# Patient Record
Sex: FEMALE | Race: WHITE | NOT HISPANIC OR LATINO | Employment: FULL TIME | URBAN - METROPOLITAN AREA
[De-identification: names, ages, dates, MRNs, and addresses within clinical notes are randomized per-mention and may not be internally consistent; named-entity substitution may affect disease eponyms.]

---

## 2017-01-11 ENCOUNTER — HOSPITAL ENCOUNTER (EMERGENCY)
Facility: HOSPITAL | Age: 52
Discharge: HOME | End: 2017-01-12
Attending: EMERGENCY MEDICINE | Admitting: EMERGENCY MEDICINE
Payer: COMMERCIAL

## 2017-01-11 ENCOUNTER — ALLSCRIPTS OFFICE VISIT (OUTPATIENT)
Dept: OTHER | Facility: OTHER | Age: 52
End: 2017-01-11

## 2017-01-11 VITALS
OXYGEN SATURATION: 97 % | SYSTOLIC BLOOD PRESSURE: 130 MMHG | RESPIRATION RATE: 18 BRPM | HEART RATE: 111 BPM | DIASTOLIC BLOOD PRESSURE: 80 MMHG | TEMPERATURE: 97.9 F

## 2017-01-11 DIAGNOSIS — T78.40XA ALLERGIC REACTION CAUSED BY A DRUG, INITIAL ENCOUNTER: Primary | ICD-10-CM

## 2017-01-11 LAB
BILIRUB UR QL STRIP: NEGATIVE
BILIRUB UR QL STRIP: NORMAL
CLARITY UR: CLEAR
CLARITY UR: NORMAL
COLOR UR: YELLOW
COLOR UR: YELLOW
COLOR, POC: YELLOW
GLUCOSE (HISTORICAL): NORMAL
GLUCOSE UR STRIP-MCNC: NEGATIVE MG/DL
HGB UR QL STRIP.AUTO: NEGATIVE
HGB UR QL STRIP.AUTO: NORMAL
KETONES UR STRIP-MCNC: NEGATIVE MG/DL
KETONES UR STRIP-MCNC: NORMAL MG/DL
LEUKOCYTE ESTERASE UR QL STRIP: ABNORMAL
LEUKOCYTE ESTERASE UR QL STRIP: NORMAL
NITRITE UR QL STRIP: NEGATIVE
NITRITE UR QL STRIP: NORMAL
PH UR STRIP.AUTO: 5 [PH]
PH UR STRIP.AUTO: 7 [PH] (ref 4.5–8)
PROT UR STRIP-MCNC: NEGATIVE MG/DL
PROT UR STRIP-MCNC: NORMAL MG/DL
SP GR UR STRIP.AUTO: 1.01 (ref 1–1.03)
SP GR UR STRIP.AUTO: 1.02
UROBILINOGEN UR QL STRIP.AUTO: 0.2
UROBILINOGEN UR QL STRIP.AUTO: 0.2 E.U./DL

## 2017-01-11 PROCEDURE — 81001 URINALYSIS AUTO W/SCOPE: CPT

## 2017-01-11 PROCEDURE — 96374 THER/PROPH/DIAG INJ IV PUSH: CPT

## 2017-01-11 PROCEDURE — 81002 URINALYSIS NONAUTO W/O SCOPE: CPT | Performed by: EMERGENCY MEDICINE

## 2017-01-11 PROCEDURE — 87086 URINE CULTURE/COLONY COUNT: CPT

## 2017-01-11 RX ORDER — FEXOFENADINE HCL 180 MG/1
180 TABLET ORAL DAILY
COMMUNITY

## 2017-01-11 RX ORDER — RANITIDINE 150 MG/1
150 TABLET ORAL 2 TIMES DAILY
COMMUNITY
End: 2020-04-06

## 2017-01-11 RX ORDER — CEPHALEXIN 250 MG/1
500 CAPSULE ORAL ONCE
Status: COMPLETED | OUTPATIENT
Start: 2017-01-11 | End: 2017-01-11

## 2017-01-11 RX ORDER — ETODOLAC 200 MG/1
200 CAPSULE ORAL AS NEEDED
COMMUNITY
End: 2022-07-29 | Stop reason: ALTCHOICE

## 2017-01-11 RX ORDER — LEVOTHYROXINE SODIUM 0.07 MG/1
75 TABLET ORAL DAILY
COMMUNITY
End: 2019-07-19

## 2017-01-11 RX ADMIN — CEPHALEXIN 500 MG: 250 CAPSULE ORAL at 23:36

## 2017-01-11 RX ADMIN — FAMOTIDINE 20 MG: 10 INJECTION, SOLUTION INTRAVENOUS at 23:18

## 2017-01-12 LAB
BACTERIA UR QL AUTO: NORMAL /HPF
NON-SQ EPI CELLS URNS QL MICRO: NORMAL /HPF
RBC #/AREA URNS AUTO: NORMAL /HPF
WBC #/AREA URNS AUTO: NORMAL /HPF

## 2017-01-12 PROCEDURE — 99283 EMERGENCY DEPT VISIT LOW MDM: CPT

## 2017-01-12 RX ORDER — CEPHALEXIN 500 MG/1
500 CAPSULE ORAL 2 TIMES DAILY
Qty: 14 CAPSULE | Refills: 0 | Status: SHIPPED | OUTPATIENT
Start: 2017-01-12 | End: 2017-01-19

## 2017-01-13 LAB — BACTERIA UR CULT: NORMAL

## 2017-01-17 ENCOUNTER — TRANSCRIBE ORDERS (OUTPATIENT)
Dept: ADMINISTRATIVE | Facility: HOSPITAL | Age: 52
End: 2017-01-17

## 2017-01-17 DIAGNOSIS — R20.2 PARESTHESIA: Primary | ICD-10-CM

## 2017-01-30 ENCOUNTER — OFFICE VISIT (OUTPATIENT)
Dept: RADIOLOGY | Facility: CLINIC | Age: 52
End: 2017-01-30
Payer: COMMERCIAL

## 2017-01-30 DIAGNOSIS — R20.2 PARESTHESIA: ICD-10-CM

## 2017-01-30 PROCEDURE — 95909 NRV CNDJ TST 5-6 STUDIES: CPT

## 2017-01-30 PROCEDURE — 95886 MUSC TEST DONE W/N TEST COMP: CPT

## 2017-02-22 ENCOUNTER — ALLSCRIPTS OFFICE VISIT (OUTPATIENT)
Dept: OTHER | Facility: OTHER | Age: 52
End: 2017-02-22

## 2017-02-22 LAB
BILIRUB UR QL STRIP: NORMAL
CLARITY UR: NORMAL
COLOR UR: YELLOW
GLUCOSE (HISTORICAL): NORMAL
HGB UR QL STRIP.AUTO: NORMAL
KETONES UR STRIP-MCNC: NORMAL MG/DL
LEUKOCYTE ESTERASE UR QL STRIP: NORMAL
NITRITE UR QL STRIP: NORMAL
PH UR STRIP.AUTO: 5 [PH]
PROT UR STRIP-MCNC: NORMAL MG/DL
SP GR UR STRIP.AUTO: 1.01
UROBILINOGEN UR QL STRIP.AUTO: 0.2

## 2017-04-19 ENCOUNTER — ALLSCRIPTS OFFICE VISIT (OUTPATIENT)
Dept: OTHER | Facility: OTHER | Age: 52
End: 2017-04-19

## 2017-04-19 ENCOUNTER — APPOINTMENT (OUTPATIENT)
Dept: OCCUPATIONAL THERAPY | Facility: HOSPITAL | Age: 52
End: 2017-04-19
Payer: COMMERCIAL

## 2017-04-19 DIAGNOSIS — Z12.31 ENCOUNTER FOR SCREENING MAMMOGRAM FOR MALIGNANT NEOPLASM OF BREAST: ICD-10-CM

## 2017-04-19 DIAGNOSIS — G56.01 CARPAL TUNNEL SYNDROME OF RIGHT WRIST: ICD-10-CM

## 2017-04-19 DIAGNOSIS — G56.02 CARPAL TUNNEL SYNDROME OF LEFT WRIST: ICD-10-CM

## 2017-04-19 PROCEDURE — L3906 WHO W/O JOINTS CF: HCPCS

## 2017-05-02 ENCOUNTER — ALLSCRIPTS OFFICE VISIT (OUTPATIENT)
Dept: OTHER | Facility: OTHER | Age: 52
End: 2017-05-02

## 2018-01-11 NOTE — RESULT NOTES
Verified Results  (Q) SJOGRENS ANTIBODIES (SS-A,SS-B) [id number] 11:21AM [employer]   REPORT COMMENT:  FASTING:YES     Test Name Result Flag Reference   SJOGREN'S ANTIBODY (SS-A) <1 0 NEG AI  <1 0 NEG   SJOGREN'S ANTIBODY (SS-B) <1 0 NEG AI  <1 0 NEG     (Q) LYME DISEASE AB, TOTAL W/REFL WB (IGG, IGM) [id number] 11:21AM [employer]     Test Name Result Flag Reference   LYME AB SCREEN < OR = 0 90 index     Index                 Interpretation                     -----                 --------------                     < or = 0 90           Negative                     0  91-1 09             Equivocal                     > or = 1 10           Positive     The use of purified VlsE-1 and PepC10 antigens in this  assay provides improved specificity compared to assays  that utilize whole cell lysates of B  burgdorferi, the  causative agent of Lyme disease, and slightly better  sensitivity compared to the C6 antibody assay  As recommended by the Food and Drug   Administration (FDA), all samples with positive or   equivocal results in a Borrelia burgdorferi antibody    EIA (screening) will be tested using a blot method  Positive or equivocal screening test results should not   be interpreted as truly positive until verified as such   using a supplemental assay (e g , B  burgdorferi blot)  The screening test and/or blot for B  burgdorferi   antibodies may be falsely negative in early stages of   Lyme disease, including the period when erythema   migrans is apparent       (Q) MONSTER IFA SCREEN W/REFL TO TITER AND PATTERN, IFA [id number] 11:21AM [employer]     Test Name Result Flag Reference   MONSTER SCREEN, IFA NEGATIVE  NEGATIVE     (1) RF QUANTITATION [id number] 11:21AM [employer]     Test Name Result Flag Reference   RHEUMATOID FACTOR 12 IU/mL  <14     (Q) SED RATE BY MODIFIED WESTERGREN [id number] 11:21AM [employer]     Test Name Result Flag Reference   SED RATE BY MODIFIED$WESTERGREN 6 mm/h  < OR = 30

## 2018-01-13 VITALS
HEIGHT: 62 IN | BODY MASS INDEX: 31.47 KG/M2 | HEART RATE: 90 BPM | DIASTOLIC BLOOD PRESSURE: 84 MMHG | SYSTOLIC BLOOD PRESSURE: 122 MMHG | WEIGHT: 171 LBS

## 2018-01-13 VITALS
WEIGHT: 172 LBS | DIASTOLIC BLOOD PRESSURE: 72 MMHG | HEIGHT: 62 IN | TEMPERATURE: 96.5 F | HEART RATE: 88 BPM | BODY MASS INDEX: 31.65 KG/M2 | SYSTOLIC BLOOD PRESSURE: 118 MMHG

## 2018-01-13 NOTE — CONSULTS
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)    Discussion/Summary    59-year-old female, accompanied by her aunt, presents to review MRI lumbar and cervical spine, and history of chronic neck and back pain  Films were reviewed in detail by Dr Antionette Del Rio and reviewed by Dr Antionette Del Rio with the patient  Cervical spine studies reveal some narrowing and mild foraminal stenosis on the right at C5-C6 without myelopathic signs or findings  Lumbar study was also reviewed and revealed borderline T10-T11 herniation without significant stenosis, and L4-L5 disc herniation without significant central or foraminal stenosis  Clinical examination was without motor-sensory deficits or myelopathic findings  Patient was cautioned to observe for future myelopathic findings such as increasing urinary incontinence, progressive gait disturbance and imbalance, difficulty with upper extremities fine motor activities such as buttoning, opening jars  No miladis lesions are appreciated at this time requiring neurosurgical intervention, and follow-up with neurosurgery will be on an as-needed basis, she does understand however should she have progressive myelopathic symptoms she is to call and return for reassessment  In the interim return follow-up with pain management (Dr Lynsey Fernandez) is advised, additionally continue physical therapy and regular back strengthening program is also recommended, no specific activity restrictions are noted  These findings, impressions and recommendations reviewed in great detail with the patient and her aunt, they expressed understanding and agreement, their questions were answered completely and to their satisfaction   And as noted she understands to call and return should she have a significant change in her neurologic status  Chief Complaint    1  Arm Pain   2  Back Pain   3  Neck Pain  Initial consultation, neck pain, back pain, right hand intermittent numbness  History of Present Illness  55-year-old female, accompanied by her aunt presents for the above complaints reports has chronic pain in these areas intermittently for the past 2-3 years or longer  Approximately one year ago however she had an exacerbation of her low back pain which necessitated a visit to the emergency department for pain medication  That has since resolved  However she does continue to report with certain activities such as shoveling or raking she will aggravate her low back  She denies radiation into her legs  Reports pain is principally in her right greater than left buttocks with some radiation to the right groin  Currently she is asymptomatic  She has followed with her family physician for this and with an exacerbation of her low back pain had an MRI of her lumbar spine in December  Neck pain is also intermittent principally involving her right shoulder right trapezius and occasionally will have numbness and tingling of her right hand  Currently she is asymptomatic  She has followed with her family physician for this and has had MRI of her cervical spine in September  Patient denies bowel or bladder symptoms associated with this, denies difficulty with ambulation or gait disturbance although does admit recently she has been a little more clumsy and falls secondary to balance issues  Denies difficulty with upper extremities relative to handling buttons, opening jars, or denies dropping things such as glasses    She has had physical therapy times 8-10 weeks with little improvement, she also was referred for pain management consultation with Dr Ryan Ruffin however elected to hold off on this pending consultation with neurosurgery (Dr Jacqui Diego)   Typically takes Motrin, Tylenol and has been on Lodine in the past as well for pain relief  But takes these medications sporadically  Has not had trial of muscle relaxants and/or narcotics  Pawel Gaona presents with complaints of gradual onset of constant episodes of moderate right > left and bilateral lower back pain, described as aching, throbbing and tingling, radiating to the right buttock, right shoulder and right neck  On a scale of 1 to 10, the patient rates the pain as 2  Associated symptoms include spasm, stiffness, night sweats, general malaise and urinary incontinence, but no fever, no abdominal pain, no weight loss, no leg numbness, no arm weakness, no leg weakness, no fecal incontinence, no urinary retention and no rash localized to the area of pain  arm numbness (R>L)  Pawel Gaona presents with complaints of neck pain  Associated symptoms include muscle spasm and upper extremity paresthesias, but no neck stiffness, no crepitus, no tenderness, no impaired range of motion, no shoulder pain, no headache, no upper extremity weakness, no impaired hearing, no impaired memory and no impaired vision  Pawel Gaona presents with complaints of no pain in the arms  Associated symptoms include tingling, but no swelling, no redness, no ecchymosis, no deformity, no stiffness, no instability, no decreased range of motion, no numbness and no weakness  Pawel Gaona presents with complaints of no elbow symptoms  Pawel Gaona presents with complaints of no finger symptoms  no forearm symptoms Pawel Gaona presents with complaints of no hand symptoms  Pawel Gaona presents with complaints of no shoulder symptoms  no wrist symptoms      Review of Systems    Constitutional: feeling tired    The patient presents with complaints of visual disturbances, described as blurry vision  ENT: no complaints of earache, no loss of hearing, no nose bleeds, no nasal discharge, no sore throat, no hoarseness     Cardiovascular: No complaints of slow heart rate, no fast heart rate, no chest pain, no palpitations, no leg claudication, no lower extremity edema  Respiratory: No complaints of shortness of breath, no wheezing, no cough, no SOB on exertion, no orthopnea, no PND  Gastrointestinal: constipation  Genitourinary: incontinence  Musculoskeletal: arthralgias, joint swelling, myalgias and joint stiffness  Integumentary: No complaints of skin rash or lesions, no itching, no skin wounds, no breast pain or lump  Neurological: headache, numbness, tingling, dizziness and N/T in the hands, and on the right side of the head , but as noted in HPI, no confusion, no limb weakness and no difficulty walking  Psychiatric: Not suicidal, no sleep disturbance, no anxiety or depression, no change in personality, no emotional problems  Endocrine: No complaints of proptosis, no hot flashes, no muscle weakness, no deepening of the voice, no feelings of weakness  Hematologic/Lymphatic: No complaints of swollen glands, no swollen glands in the neck, does not bleed easily, does not bruise easily  ROS reviewed  Active Problems    1  Allergic rhinitis (477 9) (J30 9)   2  Bulge of lumbar disc without myelopathy (722 10) (M51 26)   3  Cervical cancer screening (V76 2) (Z12 4)   4  Cervical disc disorder with radiculopathy, mid-cervical region (723 4) (M50 12)   5  Change in bowel habits (787 99) (R19 4)   6  Colon cancer screening (V76 51) (Z12 11)   7  Degenerative lumbar spinal stenosis (724 02) (M48 06)   8  Dietary calcium deficiency (269 3) (E58)   9  Encounter for gynecological examination with abnormal finding (V72 31) (Z01 411)   10  Facet degeneration of lumbar region (721 3) (M47 816)   11  Fibromyalgia (729 1) (M79 7)   12  Functional urinary incontinence (788 91) (R39 81)   13  Inadequate exercise (V69 0) (Z72 3)   14  Lactose intolerance (271 3) (E73 9)   15  Lumbar back pain (724 2) (M54 5)   16   Metrorrhagia (626 6) (N92 1) 17  Right knee pain (719 46) (M25 561)   18  Urinary frequency (788 41) (R35 0)   19  Urinary tract infection (599 0) (N39 0)   20  Visit for screening mammogram (V76 12) (Z12 31)    Past Medical History    1  History of abnormal cervical Pap smear (V13 29) (E89 443)   2  Denied: History of herpes simplex infection   3  History of infertility (V13 29) (Z87 42)   4  History of pregnancy (V13 29)   5  History of Migraine without aura and without status migrainosus, not intractable (346 10)   (G43 009)   6  History of Varicella without complication (088 8) (A98 7)    The active problems and past medical history were reviewed and updated today  Surgical History    1  History of Oral Surgery Tooth Extraction   2  History of Tubal Ligation    The surgical history was reviewed and updated today  Family History    1  Family history of hypercholesterolemia (V18 19) (Z83 49)   2  Family history of type 2 diabetes mellitus (V18 0) (Z83 3)    3  Family history of hypercholesterolemia (V18 19) (Z83 49)   4  Family history of type 2 diabetes mellitus (V18 0) (Z83 3)    5  Family history of breast cancer (V16 3) (Z80 3)    The family history was reviewed and updated today  Social History    · Alcohol use (V49 89) (F10 99)   · Caffeine use (V49 89) (F15 90)   · Dietary practices   · Denied: History of drug use   · Inadequate exercise (V69 0) (Z72 3)   ·    · Never a smoker   · Occupation   · Cheondoism  The social history was reviewed and updated today  The social history was reviewed and is unchanged  Current Meds   1  Fexofenadine HCl - 180 MG Oral Tablet; Therapy: [id number] to (Last Tammie Hernandez)  Requested for: 28Apr2011 Ordered   2  Hydrocodone-Acetaminophen 5-325 MG Oral Tablet Recorded   3  Methocarbamol 500 MG Oral Tablet; TAKE 1 TABLET 3 TIMES DAILY  as needed; Therapy: 39QCY7804 to (Evaluate:85Bcz0539)  Requested for: 539 165 738; Last   Rx:[id number] Ordered   4   Motrin 600 MG TABS; TAKE 1 TABLET 4 TIMES DAILY  WITH MEALS AS NEEDED; Therapy: (Recorded:89Uwn5842) to Recorded    The medication list was reviewed and updated today  Allergies    1  omeprazole    Physical Exam     Constitutional Patient appears healthy and well developed  No signs of acute distress present  well developed, appears healthy, comfortable, well nourished, within normal limits of ideal weight, well hydrated and appearance reflects stated age  Head and Face Normal on inspection  Neck No JVD  Carotid pulses: 2+ and equal bilaterally, without bruits  Respiratory Respiratory effort: Normal   Auscultation of lungs: Clear to auscultation bilaterally  Cardiovascular Auscultation of heart: Rate is regular  Rhythm is regular  No heart murmur appreciated  Abdomen The abdomen is flat  No abdominal scars  Abdomen is soft, nontender, and nondistended  Musculo: Spine Contour is normal  No tenderness of the spine billaterally  (Nonspecific tenderness is noted with palpation of the right shoulder and trapezius)  Cervical Spine examination demonstrates no visible abnormailities, normal lordosis, no scoliosis, no spine tenderness on palpation, no masses, full ROM and no pain with motion in any direction  Lumbar/Sacral Spine examination demonstrates no visible abnormailities, normal lordosis, no scoliosis, no spine tenderness on palpation, no masses, full ROM and no pain with motion in any direction  Motor Exam: all motor groups within normal limits of strength & tone bilaterally  Sensory Exam: all sensory within normal limits bilaterally  Deep Tendon Reflexes: all reflexes within normal limits bilaterally  Skin warm and dry  Neurologic - Mental Status: Alert and Oriented x3  Mood and affect: Abnormal   Mood and Affect: anxious  Grossly nonfocal     Cranial Nerve Exam:  2nd cranial nerve: Visual field was full to confrontation  3rd, 4th, and 6th cranial nerves: Normal with no deficit    5th cranial nerve: Sensation was intact in all three divisions to light touch and temperature  Motor function was intact  7th cranial nerve: Face symmetrical at grimace and at rest  8th cranial nerve: Grossly intact to finger rub bilaterally  9th and 10th cranial nerves: Uvula is midline  11th cranial nerve: Shoulder shrug equal bilaterally  12th cranial nerve: Tongue mideline, no atrophy present  Motor System General Motor Strength: No pronator drift and no parietal drift  Motor System - Upper Extremities: Normal to inspection and palpation  Strength: Deltoids 5/5 bilaterally  Biceps 5/5 bilaterally  Triceps 5/5 bilaterally  Extensor carpi radials is 5/5 bilaterally  Extensor digitorum 5/5 bilaterally  Intrinsic 5/5 bilaterally   5/5 bilaterally  Motor Strength:  the patient is right hand dominant  Strength examination: wrist strength was normal bilaterally  elbow strength was normal bilaterally  shoulder strength was normal bilaterally  Muscle tone: Normal bilaterally  Muscle Bulk: Normal bilaterally  Motor System - Lower Extremities: Normal to inspection and palpation  Strength: iliopsoas 5/5 bilaterally  Quadriceps 5/5 bilaterally  Hamstrings 5/5 bilaterally  Gastrocnemius 5/5 bilaterally  Strength examination: Foot and ankle strength was normal bilaterally  Knee strength was normal bilaterally  Hip strength was normal bilaterally  Reflexes: Biceps reflexes are 2+ bilaterally  Triceps reflexes are 2+ bilaterally  Achilles reflexes are 2+ bilaterally  Babinski's reflex is 2+ down going bilaterally  Ankle clonus is absent bilaterally  Deep tendon reflexes: 1+ right ankle jerk and 1+ left ankle jerk2+ right triceps, 2+ left triceps, 2+ right brachioradialis, 2+ left brachioradialis, 2+ right patella, 2+ left patella, no ankle clonus on the right and no ankle clonus on the left  Shen sign was not present:   Coordination: Finger to nose was normal    Sensory: Sensation grossly intact to light touch   Sensation grossly intact to light touch  Gait and Station: [place] with a normal gait  Results/Data  Results   * MRI Lumbar Spine With and Without Contrast [id number] 05:29PM Mika Bledsoe     Test Name Result Flag Reference   MRI LSpine W/ & W/O (Report)     [address] [facility];;[place];[address]   12/15/2015 1735   12/15/2015 1825   N/A     MRI LUMBAR SPINE WITH AND WITHOUT CONTRAST     INDICATION- Follow-up abnormal examination  Severe back pain  M 54 5  COMPARISON- 11/9/2015  TECHNIQUE- Sagittal T1, sagittal T2, sagittal inversion recovery,   axial T1 and axial T2, coronal T2  Sagittal and axial T1 postcontrast    Additional postcontrast imaging performed with fat suppression of the   sacrum  7 mL of Gadavist was injected intravenously with no immediate   consequence  IMAGE QUALITY- Diagnostic     FINDINGS-     ALIGNMENT- Normal alignment of the lumbar spine  No compression   fracture  No spondylolysis or spondylolisthesis  No scoliosis  MARROW SIGNAL- Normal marrow signal is identified within the   visualized bony structures  No discrete marrow lesion  DISTAL CORD AND CONUS- Normal size and signal of the distal cord and   conus  The conus ends at the L1 level  PARASPINAL SOFT TISSUES- Paraspinal soft tissues are unremarkable  SACRUM- Once again identified are multiple cystic masses extending   from the thecal sac through the sacral foramen into the presacral soft   tissues  Postcontrast imaging was performed with fat suppression   demonstrating no enhancement consistent with sacral root sleeve cysts  These are largest anteriorly to the sacrum on the right inferiorly   measuring up to 1 7 cm in transverse diameter  LOWER THORACIC DISC SPACES- Normal disc height and signal  No disc   herniation, canal stenosis or foraminal narrowing  LUMBAR DISC SPACES-        L1-L2- Normal       L2-L3- Slight disc desiccation  Mild annular bulging diffusely   Mild canal stenosis and foraminal narrowing without nerve impingement  L3-L4- Disc desiccation with mild diffuse annular bulging  Mild to    moderate facet hypertrophic degenerative change  Mild to moderate    canal stenosis with mild bilateral foraminal narrowing  L4-L5- Disc desiccation without loss of disc height  Mild diffuse    annular bulging  Small broad-based right foraminal disc protrusion  Facet degenerative change with moderate canal stenosis  Mild left    foraminal narrowing at slightly more pronounced right foraminal    narrowing  L5-S1- Normal disc height and signal  No canal stenosis or foraminal    narrowing  POSTCONTRAST IMAGING- No abnormal enhancement  IMPRESSION-     Stable mild lumbar degenerative disc disease  See previous noncontrast   MRI report  The cystic masses extending from the sacral canal, through the neural   foramen into the presacral soft tissues do not demonstrate enhancement   and 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  Transcribed on- [facility], RAD DO   [place] Radiologist- [address] , CHRISTA BRAXTON   Electronically [address] [facility]    Released Date Time- 12/16/15 1020   ------------------------------------------------------------------------------   9381^GENE A EVA   9381^GENE A EVA     * MRI Cervical Spine Without Contrast [id number] 05:03PM Jacque Shane     Test Name Result Flag Reference   MRI CSpine W/O (Report)     [facility] [address];;[place];[address]   09/09/2015 1715   09/09/2015 1737   N/A     MRI CERVICAL SPINE WITHOUT CONTRAST     INDICATION- Neck pain  Bilateral upper extremity numbness, several   months  Brachial neuritis  723 4     COMPARISON- None  TECHNIQUE- Sagittal T1, sagittal T2, sagittal inversion recovery,   axial T2, axial 2-D merge          IMAGE QUALITY- Diagnostic     FINDINGS-     ALIGNMENT- Normal alignment of the cervical spine  No compression   fracture  No subluxation  No scoliosis  MARROW SIGNAL- Normal marrow signal is identified within the   visualized bony structures  No discrete marrow lesion  CERVICAL AND VISUALIZED THORACIC CORD- Normal signal within the   visualized cord  PREVERTEBRAL AND PARASPINAL SOFT TISSUES- The thyroid gland is   slightly heterogeneous with numerous tiny nodules  VISUALIZED POSTERIOR FOSSA- The visualized posterior fossa   demonstrates no abnormal signal      CERVICAL DISC SPACES-        C2-C3- Normal      C3-C4- Normal      C4-C5- Normal      C5-C6- Slight loss of disc height  Mild diffuse annular bulging with   a small broad-based right paracentral and foraminal disc protrusion and   asymmetric uncinate joint hypertrophic change  Moderate canal stenosis   with slight flattening of the cervical cord  Mild left and moderate   right foraminal narrowing  C6-C7- Tiny central disc protrusion  No canal stenosis or foraminal   narrowing  C7-T1- Normal      UPPER THORACIC DISC SPACES- Normal      IMPRESSION-     C5-6 degenerative disc disease with annular bulging and right   paracentral/foraminal disc protrusion/uncinate joint hypertrophic   change  Moderate canal stenosis with mild left and moderate right   foraminal narrowing  Correlate for right C6 radiculopathy  Tiny central disc protrusion C6-7 without canal stenosis or foraminal   narrowing  Transcribed on- [address], RAD DO   [place] Radiologist- [address] [place] Richardson DO   Electronically [facility] DO   Released Date Time- 09/10/15 0929   ------------------------------------------------------------------------------   9381^DIANN VELASQUEZ   9381^DIANN VELASQUEZ     Attending Note  Collaborating Note: I interviewed and examined the patient, I supervised the Advanced Practitioner and I agree with the Advanced Practitioner note   I discussed the case with the Advanced Practitioner and reviewed the AP note I agree with the Advanced Practitioner note except patient with back pain, neck pain, intermittent right arm tingling, some gait imbalance, and likely minor stress incontinence  MRI CSP, LSP personallly reviewed with her and her aunt  Perhaps some mild spondylosis at T10-11, but no myelomalacia  HNP at L4-5, but no evidence of lumbosacral radiculopathy  Slight HNP in cervical spine, but no clear cervical radiculopathy  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

## 2018-01-13 NOTE — RESULT NOTES
Verified Results  * XR HIP/PELV 2-3 VWS LEFT W PELVIS IF PERFORMED 17Aug2016 02:22PM Ilya Gloss Order Number: [id number]     Test Name Result Flag Reference   * XR HIP/PELV 2-3 VWS LEFT (Report)     LEFT HIP     INDICATION: Intermittent left hip pain  M 25 552     COMPARISON: None     VIEWS: AP pelvis and 2 coned down views; 3 images     FINDINGS:     There is no fracture or dislocation  No degenerative changes  No lytic or blastic lesions are seen  Soft tissues are unremarkable  IMPRESSION:     No acute osseous abnormality         Workstation performed: [id number]     Signed by:   Juan Burt MD   8/18/16

## 2018-01-14 VITALS
BODY MASS INDEX: 31.47 KG/M2 | HEIGHT: 62 IN | SYSTOLIC BLOOD PRESSURE: 126 MMHG | DIASTOLIC BLOOD PRESSURE: 80 MMHG | WEIGHT: 171 LBS

## 2018-01-15 NOTE — PROGRESS NOTES
Chief Complaint  Patient was seen today for a urine check as a follow up to a urine infection  After testing the urine it just came up with a trace so she will go for a u/a at the lab and will follow up if she starts with any other symptoms  Active Problems    1  Allergic rhinitis (477 9) (J30 9)   2  Bulge of lumbar disc without myelopathy (722 10) (M51 26)   3  Cervical cancer screening (V76 2) (Z12 4)   4  Cervical disc disorder with radiculopathy, mid-cervical region (723 4) (M50 12)   5  Cervicalgia (723 1) (M54 2)   6  Change in bowel habits (787 99) (R19 4)   7  Colon cancer screening (V76 51) (Z12 11)   8  Degenerative cervical disc (722 4) (M50 30)   9  Degenerative lumbar disc (722 52) (M51 36)   10  Degenerative lumbar spinal stenosis (724 02) (M48 06)   11  Dietary calcium deficiency (269 3) (E58)   12  Encounter for gynecological examination with abnormal finding ( 31) (Z01 411)   13  Facet degeneration of lumbar region (721 3) (M47 816)   14  Fibromyalgia (729 1) (M79 7)   15  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)   16  Functional urinary incontinence (788 91) (R39 81)   17  Herniated 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) (M51 24)   18  Inadequate exercise (V69 0) (Z72 3)   19  Lactose intolerance (271 3) (E73 9)   20  Lumbar back pain (724 2) (M54 5)   21  Metrorrhagia (626 6) (N92 1)   22  Right knee pain (719 46) (M25 561)   23  Thoracic back pain (724 1) (M54 6)   24  Urinary frequency (788 41) (R35 0)   25  Urinary tract infection (599 0) (N39 0)   26  Visit for screening mammogram (V76 12) (Z12 31)    Current Meds   1  Fexofenadine HCl - 180 MG Oral Tablet; Therapy: 48KZH8889 to (Last  Or)  Requested for: 2011 Ordered   2  Hydrocodone-Acetaminophen 5-325 MG Oral Tablet Recorded   3  Meloxicam 15 MG Oral Tablet; TAKE 1 TABLET DAILY WITH FOOD; Therapy: 58KNS4119 to (QQYR97UMH4621); Last Rx:2016 Ordered   4   Methocarbamol 500 MG Oral Tablet; TAKE 1 TABLET 3 TIMES DAILY  as needed; Therapy: 31FFI4133 to (Evaluate:53Qsr2729)  Requested for: 215 706 459; Last   Rx:39Rsn7136 Ordered    Allergies    1  omeprazole    Results/Data  Urine Dip Non-Automated- POC 21Apr2016 12:00AM [facility]     Test Name Result Flag Reference   Color Yellow     Clarity Transparent     Leukocytes trace     Nitrite neg     Blood neg     Bilirubin +++     Urobilinogen 0 2     Protein +     Ph 6 0     Specific Gravity 1 030     Ketone trace     Glucose neg         Plan  Urinary frequency, Urinary tract infection    · (1) URINALYSIS w URINE C/S REFLEX (will reflex a microscopy if leukocytes, occult  blood, or nitrites are not within normal limits); Status:Active; Requested for:21Apr2016;    · Urine Dip Non-Automated- POC; Status:Complete - Retrospective By Protocol  Authorization;   Done: 21Apr2016 12:00AM    Signatures   Electronically signed by : Tony Vega, ; Apr 21 2016  4:21PM EST                       (Author)    Electronically signed by : Ela Louis DO;  Apr 22 2016  7:48AM EST                       (Author)

## 2018-01-15 NOTE — PROGRESS NOTES
Chief Complaint  Patient was seen today for a urine check she was having pain with voiding and frequency after testing her urine it was positive for a urine infection and medication was sent to the pharmacy for her  She is aware to call if symptoms don't resolve after finishing her medication  Active Problems    1  Allergic rhinitis (477 9) (J30 9)   2  Arthralgia (719 40) (M25 50)   3  Arthralgia of hip, left (719 45) (M25 552)   4  Bulge of lumbar disc without myelopathy (722 10) (M51 26)   5  Cervical cancer screening (V76 2) (Z12 4)   6  Cervical disc disorder with radiculopathy, mid-cervical region (723 4) (M50 12)   7  Cervicalgia (723 1) (M54 2)   8  Change in bowel habits (787 99) (R19 4)   9  Colon cancer screening (V76 51) (Z12 11)   10  Degenerative cervical disc (722 4) (M50 30)   11  Degenerative lumbar disc (722 52) (M51 36)   12  Degenerative lumbar spinal stenosis (724 02) (M48 06)   13  Dietary calcium deficiency (269 3) (E58)   14  Encounter for gynecological examination with abnormal finding (V72 31) (Z01 411)   15  Facet degeneration of lumbar region (721 3) (M47 816)   16  Fibromyalgia (729 1) (M79 7)   17  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)   18  Frequent UTI (599 0) (N39 0)   19  Functional urinary incontinence (788 91) (R39 81)   20  Herniated 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) (M51 24)   21  Inadequate exercise (V69 0) (Z72 3)   22  Lactose intolerance (271 3) (E73 9)   23  Left elbow pain (719 42) (M25 522)   24  Lumbar back pain (724 2) (M54 5)   25  Metrorrhagia (626 6) (N92 1)   26  Right knee pain (719 46) (M25 561)   27  Thoracic back pain (724 1) (M54 6)   28  Urinary frequency (788 41) (R35 0)   29  Urinary tract infection (599 0) (N39 0)   30  Visit for screening mammogram (V76 12) (Z12 31)    Current Meds   1  Etodolac 400 MG Oral Tablet; Take 1 tablet twice daily as needed Recorded   2  Evening Primrose Oil CAPS; 2 qd Recorded   3   Fexofenadine HCl - 180 MG Oral Tablet; Take 1 tablet daily Recorded   4  Fexofenadine HCl - 180 MG Oral Tablet; Therapy: 87PCJ4433 to (Last Peter Leger)  Requested for: 93Qwo7820 Ordered   5  Flonase 50 MCG/ACT SUSP; INSTILL 2 SPRAY Daily Recorded   6  LevoFLOXacin 250 MG Oral Tablet; TAKE 1 TABLET DAILY AS DIRECTED; Therapy: 10Bxl4025 to (Evaluate:61Pay6105)  Requested for: 66Ftg2854; Last   Rx:10Iwi7119 Ordered   7  Meloxicam 15 MG Oral Tablet; TAKE 1 TABLET DAILY WITH FOOD; Therapy: 94GNH7977 to (SENSQ:40CDA0728); Last Rx:56Ndm4649 Ordered   8  Multivitamins Oral Capsule Recorded   9  Super Omega 3 500 MG Oral Capsule; 2 qd Recorded   10  Vitamin C 500 MG Oral Tablet; 2qd Recorded    Allergies    1  Cipro TABS   2  omeprazole    Results/Data  Urine Dip Non-Automated- POC 69NKP1483 02:34PM Eva Braswell     Test Name Result Flag Reference   Color Yellow     Clarity Transparent     Leukocytes +     Nitrite neg     Blood neg     Bilirubin +++ red     Urobilinogen 0 2     Protein +     Ph 6 0     Specific Gravity 1 025     Ketone 15 SMALL     Glucose NEG         Plan  Urinary tract infection    · Doxycycline Hyclate 100 MG Oral Tablet; Take 1 tablet twice daily   · Urine Dip Non-Automated- POC; Status:Complete - Retrospective By Protocol  Authorization;   Done: 33MKK4574 02:34PM    Future Appointments    Date/Time Provider Specialty Site   01/11/2017 03:45 PM Eva Braswell, [address] [place] [address]     Signatures   Electronically signed by :  Estevan March, ; Dec 19 2016  2:37PM EST                       (Author)    Electronically signed by : Micheal Geiger DO; Dec 19 2016  2:51PM EST                       (Author)

## 2018-01-15 NOTE — RESULT NOTES
Verified Results  US KIDNEY AND BLADDER 10Jun2016 03:48PM Micheal Walker Order Number: [id number]     Test Name Result Flag Reference   US KIDNEY AND BLADDER (Report)     RENAL ULTRASOUND     INDICATION: History of recurrent bladder infections  COMPARISON: Pelvic ultrasound from 10/16/2013  TECHNIQUE:  Ultrasound of the retroperitoneum was performed with a curvilinear transducer utilizing volumetric sweeps and still imaging techniques  FINDINGS:     KIDNEYS:   Symmetric and normal size  Right kidney: 11 6 cm  Normal echogenicity and contour  No suspicious masses detected  No hydronephrosis  No shadowing calculi  No perinephric fluid collections  Left kidney: 11 4 cm  Normal echogenicity and contour  No suspicious masses detected  No hydronephrosis  No shadowing calculi  No perinephric fluid collections  URETERS:   Nonvisualized  BLADDER:    Normally distended  No focal thickening or mass lesions                   IMPRESSION:     Normal         Workstation performed: [id number]     Signed by:   Elizabeth Lange MD   6/14/16

## 2018-01-15 NOTE — PROGRESS NOTES
Chief Complaint  Patient is here today for a urine check she is having some pressure and discomfort with voiding  After testing her urine it was positive for a urine infection medication was sent to the pharmacy for her and she was also given a card for [facility] urology  I will put a order in for her so they can call her to set up appt  Active Problems    1  Allergic rhinitis (477 9) (J30 9)   2  Arthralgia (719 40) (M25 50)   3  Arthralgia of hip, left (719 45) (M25 552)   4  Bulge of lumbar disc without myelopathy (722 10) (M51 26)   5  Carpal tunnel syndrome of left wrist (354 0) (G56 02)   6  Carpal tunnel syndrome of right wrist (354 0) (G56 01)   7  Cervical cancer screening (V76 2) (Z12 4)   8  Cervical disc disorder with radiculopathy, mid-cervical region (723 4) (M50 12)   9  Cervicalgia (723 1) (M54 2)   10  Change in bowel habits (787 99) (R19 4)   11  Colon cancer screening (V76 51) (Z12 11)   12  Degenerative cervical disc (722 4) (M50 30)   13  Degenerative lumbar disc (722 52) (M51 36)   14  Degenerative lumbar spinal stenosis (724 02) (M48 06)   15  Dietary calcium deficiency (269 3) (E58)   16  Encounter for gynecological examination with abnormal finding (V72 31) (Z01 411)   17  Facet degeneration of lumbar region (721 3) (M47 816)   18  Fibromyalgia (729 1) (M79 7)   19  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)   20  Frequent UTI (599 0) (N39 0)   21  Functional urinary incontinence (788 91) (R39 81)   22  Herniated 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) (M51 24)   23  Inadequate exercise (V69 0) (Z72 3)   24  Lactose intolerance (271 3) (E73 9)   25  Left acute otitis media (382 9) (H66 92)   26  Left elbow pain (719 42) (M25 522)   27  Lumbar back pain (724 2) (M54 5)   28  Metrorrhagia (626 6) (N92 1)   29  Paresthesia of both hands (782 0) (R20 2)   30  Right knee pain (719 46) (M25 561)   31  Thoracic back pain (724 1) (M54 6)   32   Urinary frequency (788 41) (R35 0) 33  Urinary tract infection (599 0) (N39 0)   34  Visit for screening mammogram (V76 12) (Z12 31)    Current Meds   1  Cephalexin CAPS; Therapy: (Recorded:12Jan2017) to Recorded   2  Etodolac 400 MG Oral Tablet; Take 1 tablet twice daily as needed Recorded   3  Evening Primrose Oil CAPS; 2 qd Recorded   4  Fexofenadine HCl - 180 MG Oral Tablet; Take 1 tablet daily Recorded   5  Fexofenadine HCl - 180 MG Oral Tablet; Therapy: 14BFP6555 to (Last Bernarda Sheth)  Requested for: 28Apr2011 Ordered   6  Flonase 50 MCG/ACT SUSP; INSTILL 2 SPRAY Daily Recorded   7  Meloxicam 15 MG Oral Tablet; TAKE 1 TABLET DAILY WITH FOOD; Therapy: 33YKX2081 to (TFOYA:06HKJ8554); Last Rx:07Apr2016 Ordered   8  Multivitamins Oral Capsule Recorded   9  Nitrofurantoin Macrocrystal 50 MG Oral Capsule; TAKE 1 CAPSULE AT BEDTIME; Therapy: 63DBM0756 to (Evaluate:04Bhy7514); Last Rx:11Jan2017 Ordered   10  Super Omega 3 500 MG Oral Capsule; 2 qd Recorded   11  Vitamin C 500 MG Oral Tablet; 2qd Recorded    Allergies    1  Cipro TABS   2  omeprazole    Plan  Urinary tract infection    · Nitrofurantoin Monohyd Macro 100 MG Oral Capsule (Macrobid); TAKE 1 CAPSULE  EVERY 12 HOURS DAILY   · Urine Dip Non-Automated- POC; Status:Active - Perform Order; Requested  for:54Hwu9575; Future Appointments    Date/Time Provider Specialty Site   04/19/2017 02:35 PM CARMELA Rojas  Orthopedic Surgery [address]     Signatures   Electronically signed by :  Estevan March, ; Feb 22 2017  4:19PM EST                       (Author)    Electronically signed by : Micheal Geiger DO; Feb 22 2017  4:35PM EST                       (Author)

## 2018-01-16 NOTE — RESULT NOTES
Verified Results  * MAMMO SCREENING BILATERAL W CAD 47STT6482 03:27PM Marybel Rosenthal     Test Name Result Flag Reference   MAMMO SCREENING BILATERAL W CAD (Report)     Patient History:   Family history of breast cancer in paternal half sister at age    40  Benign stereotactic core biopsy of the right breast, April 9, 2003  Core biopsy of the right breast    Patient has never smoked  Patient's BMI is 31 2  Reason for exam: screening (asymptomatic)  Mammo Screening Bilateral W CAD: March 4, 2016 - Check In #:    [de-identified]   Bilateral MLO, CC, and XCCL view(s) were taken  Technologist: Vera Abbott RT(R)(M)   Prior study comparison: December 6, 2013, bilateral screening    mammogram, performed at [facility]  March 20, 2012, bilateral screening mammogram, performed at [facility]  December 10, 2010, bilateral screening    mammogram, performed at [facility]  September 4, 2009, bilateral screening mammogram, performed at [facility]  February 15, 2008, bilateral digital    screening mammo w/CAD, performed at [address] [place] [address]  The breast tissue is heterogeneously dense, potentially limiting    the sensitivity of mammography  Patient risk, included in this    report, assists in determining the appropriate screening regimen    (such as 3-D mammography or the inclusion of automated breast    ultrasound or MRI)  3-D mammography may also remain indicated as    screening  The parenchymal pattern appears stable  No dominant soft tissue    mass or suspicious calcifications are noted  The skin and nipple   contours are within normal limits  No mammographic evidence of malignancy  No    significant changes when compared with prior studies  ASSESSMENT: BiRad:1 - Negative     Recommendation:   Routine screening mammogram in 1 year  A reminder letter will be   scheduled     Analyzed by CAD     8-10% of cancers will be missed on mammography  Management of a    palpable abnormality must be based on clinical grounds  Patients   will be notified of their results via letter from our facility  Accredited by [employer] of [employer] and FDA       Transcription Location: BILL [place] [facility]: [id number]     Risk Value(s):   Deanaer-Cuzeus 10 Year: 6 429%, Deanaer-Devin Lifetime: 25 260%,    Myriad Table: 2 6%, MANSOOR 5 Year: 1 4%, NCI Lifetime: 12 7%   Signed by:   Arthur Castillo MD   3/8/16

## 2018-01-17 NOTE — PROGRESS NOTES
Chief Complaint  Patient was seen today for a urine check she was having leakage and frequency with some discomfort  After testing her urine it was positive for a urine infection  We will send medication to the pharmacy and she was told if symptom continue she should call the office  Active Problems    1  Allergic rhinitis (477 9) (J30 9)   2  Bulge of lumbar disc without myelopathy (722 10) (M51 26)   3  Cervical cancer screening (V76 2) (Z12 4)   4  Cervical disc disorder with radiculopathy, mid-cervical region (723 4) (M50 12)   5  Cervicalgia (723 1) (M54 2)   6  Change in bowel habits (787 99) (R19 4)   7  Colon cancer screening (V76 51) (Z12 11)   8  Degenerative cervical disc (722 4) (M50 30)   9  Degenerative lumbar disc (722 52) (M51 36)   10  Degenerative lumbar spinal stenosis (724 02) (M48 06)   11  Dietary calcium deficiency (269 3) (E58)   12  Encounter for gynecological examination with abnormal finding (V72 31) (Z01 411)   13  Facet degeneration of lumbar region (721 3) (M47 816)   14  Fibromyalgia (729 1) (M79 7)   15  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)   16  Functional urinary incontinence (788 91) (R39 81)   17  Herniated 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) (M51 24)   18  Inadequate exercise (V69 0) (Z72 3)   19  Lactose intolerance (271 3) (E73 9)   20  Lumbar back pain (724 2) (M54 5)   21  Metrorrhagia (626 6) (N92 1)   22  Right knee pain (719 46) (M25 561)   23  Thoracic back pain (724 1) (M54 6)   24  Urinary frequency (788 41) (R35 0)   25  Urinary tract infection (599 0) (N39 0)   26  Visit for screening mammogram (V76 12) (Z12 31)    Current Meds   1  Fexofenadine HCl - 180 MG Oral Tablet; Therapy: 14MUY2177 to (Last Laurie Holley)  Requested for: 28Apr2011 Ordered   2  Hydrocodone-Acetaminophen 5-325 MG Oral Tablet Recorded   3  Meloxicam 15 MG Oral Tablet; TAKE 1 TABLET DAILY WITH FOOD; Therapy: 07JKK4149 to (QTGRH:82ERZ9371);  Last Rx:07Apr2016 Ordered   4  Methocarbamol 500 MG Oral Tablet; TAKE 1 TABLET 3 TIMES DAILY  as needed; Therapy: 49SEG6206 to (Evaluate:86Iet6120)  Requested for: 647 284 181; Last   Rx:49Rhh2802 Ordered    Allergies    1  omeprazole    Results/Data  Urine Dip Non-Automated- POC 12AAT4165 04:29PM Aleida Aquino     Test Name Result Flag Reference   Color Yellow     Clarity Transparent     Leukocytes ++     Nitrite neg     Blood neg     Bilirubin neg     Urobilinogen 0 2     Protein +     Ph 6 5     Specific Gravity 1 005     Ketone 40 mod     Glucose neg         Plan  Urinary frequency, Urinary tract infection    · Urine Dip Non-Automated- POC; Status:Complete - Retrospective By Protocol  Authorization;   Done: 48CJJ6183 04:29PM  Urinary tract infection    · Ciprofloxacin HCl - 250 MG Oral Tablet; Take 1 tablet twice daily    Signatures   Electronically signed by :  Sheri Moreno, ; Jun 2 2016  4:33PM EST                       (Author)    Electronically signed by : Josep Ledesma DO; Bill  3 2016  7:52AM EST                       (Author)

## 2018-01-18 NOTE — PROGRESS NOTES
Chief Complaint  Patient was seen today for a urine check she was having back pain and joint pain with frequency  After testing her urine it did show traces of a urine infection  As per Dr Uziel Gamez she is going to start her on medication and then have her go to the lab for a urine culture after she is done with medication  Active Problems   1  Allergic rhinitis (477 9) (J30 9)  2  Bulge of lumbar disc without myelopathy (722 10) (M51 26)  3  Cervical cancer screening (V76 2) (Z12 4)  4  Cervical disc disorder with radiculopathy, mid-cervical region (723 4) (M50 12)  5  Cervicalgia (723 1) (M54 2)  6  Change in bowel habits (787 99) (R19 4)  7  Colon cancer screening (V76 51) (Z12 11)  8  Degenerative cervical disc (722 4) (M50 30)  9  Degenerative lumbar disc (722 52) (M51 36)  10  Degenerative lumbar spinal stenosis (724 02) (M48 06)  11  Dietary calcium deficiency (269 3) (E58)  12  Encounter for gynecological examination with abnormal finding (V72 31) (Z01 411)  13  Facet degeneration of lumbar region (721 3) (M47 816)  14  Fibromyalgia (729 1) (M79 7)  15  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)  16  Frequent UTI (599 0) (N39 0)  17  Functional urinary incontinence (788 91) (R39 81)  18  Herniated 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) (M51 24)  19  Inadequate exercise (V69 0) (Z72 3)  20  Lactose intolerance (271 3) (E73 9)  21  Lumbar back pain (724 2) (M54 5)  22  Metrorrhagia (626 6) (N92 1)  23  Right knee pain (719 46) (M25 561)  24  Thoracic back pain (724 1) (M54 6)  25  Urinary frequency (788 41) (R35 0)  26  Urinary tract infection (599 0) (N39 0)  27  Visit for screening mammogram (V76 12) (Z12 31)    Current Meds  1  Fexofenadine HCl - 180 MG Oral Tablet; Therapy: 57FMG2661 to (Last Puneet Beatris)  Requested for: 28Apr2011 Ordered  2  Hydrocodone-Acetaminophen 5-325 MG Oral Tablet Recorded  3  Meloxicam 15 MG Oral Tablet; TAKE 1 TABLET DAILY WITH FOOD;    Therapy: 79BMR9925 to (UACMW:51OFF1722); Last Rx:07Apr2016 Ordered  4  Methocarbamol 500 MG Oral Tablet; TAKE 1 TABLET 3 TIMES DAILY  as needed; Therapy: 46KNS1932 to (Evaluate:33Fpn9795)  Requested for: 775 409 915; Last   Rx:33Ozy8027 Ordered  5  Nitrofurantoin Monohyd Macro 100 MG Oral Capsule; 1 TAB BID; Therapy: 89SPM2587 to (Evaluate:17Jun2016)  Requested for: 04YTM6144; Last   Rx:07Jun2016 Ordered    Allergies   1  Cipro TABS  2  omeprazole    Results/Data  Urine Dip Non-Automated- POC 10QNY7122 12:00AM José Antonio Barlow     Test Name Result Flag Reference   Color Yellow     Clarity Transparent     Leukocytes trace     Nitrite neg     Blood neg     Bilirubin +++     Urobilinogen 0 2     Protein neg     Ph 5 0     Specific Gravity 1 005     Ketone 5 trace     Glucose neg         Plan  Urinary tract infection    · Start: Nitrofurantoin Monohyd Macro 100 MG Oral Capsule (Macrobid); TAKE 1 CAPSULE  EVERY 12 HOURS DAILY   · (1) URINALYSIS w URINE C/S REFLEX (will reflex a microscopy if leukocytes, occult  blood, or nitrites are not within normal limits); Status:Active - Retrospective By Protocol  Authorization; Requested for:94Glu5211;    · Urine Dip Non-Automated- POC; Status:Complete - Retrospective By Protocol  Authorization;   Done: 11VDU2021 12:00AM    Signatures   Electronically signed by :  Tay Lockhart, ; Jul 13 2016  1:41PM EST                       (Author)    Electronically signed by : Ruslan Monson DO; Jul 13 2016  3:16PM EST                       (Author)

## 2019-07-19 ENCOUNTER — ANNUAL EXAM (OUTPATIENT)
Dept: OBGYN CLINIC | Facility: CLINIC | Age: 54
End: 2019-07-19
Payer: COMMERCIAL

## 2019-07-19 VITALS
DIASTOLIC BLOOD PRESSURE: 80 MMHG | HEART RATE: 70 BPM | WEIGHT: 172.2 LBS | HEIGHT: 62 IN | BODY MASS INDEX: 31.69 KG/M2 | SYSTOLIC BLOOD PRESSURE: 120 MMHG | OXYGEN SATURATION: 100 %

## 2019-07-19 DIAGNOSIS — Z12.31 ENCOUNTER FOR SCREENING MAMMOGRAM FOR BREAST CANCER: ICD-10-CM

## 2019-07-19 DIAGNOSIS — Z12.4 ENCOUNTER FOR PAPANICOLAOU SMEAR FOR CERVICAL CANCER SCREENING: ICD-10-CM

## 2019-07-19 DIAGNOSIS — N95.1 MENOPAUSAL SYMPTOMS: ICD-10-CM

## 2019-07-19 DIAGNOSIS — Z11.51 SCREENING FOR HPV (HUMAN PAPILLOMAVIRUS): ICD-10-CM

## 2019-07-19 DIAGNOSIS — Z01.419 ENCOUNTER FOR GYNECOLOGICAL EXAMINATION WITHOUT ABNORMAL FINDING: Primary | ICD-10-CM

## 2019-07-19 PROCEDURE — G0145 SCR C/V CYTO,THINLAYER,RESCR: HCPCS | Performed by: NURSE PRACTITIONER

## 2019-07-19 PROCEDURE — 87624 HPV HI-RISK TYP POOLED RSLT: CPT | Performed by: NURSE PRACTITIONER

## 2019-07-19 PROCEDURE — 99396 PREV VISIT EST AGE 40-64: CPT | Performed by: NURSE PRACTITIONER

## 2019-07-19 NOTE — PROGRESS NOTES
Assessment / Plan    1  Encounter for gynecological examination without abnormal finding  Normal well woman exam  Pap with hpv updated    2  Encounter for Papanicolaou smear for cervical cancer screening      3  Screening for HPV (human papillomavirus)      4  Encounter for screening mammogram for breast cancer    - Mammo screening bilateral w cad; Future    5  Menopausal symptoms  Discussed nature of menopause and its symptoms at length  No FDA approved effective tx for low libido for women at this time  Given website for education about intimacy during menopause  Discussed implementing cardio exercise and coconut oil  She will call if she desire local vaginal estrogen therapy  Gena Lozada is a 47 y o  female who presents for her annual gynecologic exam     See by Dr Trell Darden last in   Last pap:  ph negative  Last mammogram:   Colonoscopy- up to date    C/o hot flashes/sweats (few times a week) and lack of sex drive -  29 years, never was a problem before menopause  Recent full set of lab work with PCP was normal, including her thyroid hormone  Also dryness and some discomfort with sex  Current contraception: post menopausal status  History of abnormal Pap smear: yes - remote hx  Family history of breast,uterine, ovarian or colon cancer: yes - breast in 1/2 sister    Menstrual History:  OB History        3    Para   3    Term   3       0    AB   0    Living   3       SAB   0    TAB   0    Ectopic   0    Multiple   0    Live Births   3                Menarche age: 15  No LMP recorded (lmp unknown)   Patient is postmenopausal   Period Cycle (Days): (corey age 48)    The following portions of the patient's history were reviewed and updated as appropriate: allergies, current medications, past family history, past medical history, past social history, past surgical history and problem list     Review of Systems      Review of Systems Constitutional: Negative for chills and fever  Gastrointestinal: Negative for abdominal distention, abdominal pain, blood in stool, constipation, diarrhea, nausea and vomiting  Endocrine: Positive for heat intolerance (hot flashes, sweats)  Genitourinary: Negative for difficulty urinating, dysuria, frequency, genital sores, hematuria, menstrual problem, pelvic pain, urgency, vaginal bleeding and vaginal discharge  Decreased libido     Breasts:  Negative for skin changes, dimpling, asymmetry, nipple discharge, redness, tenderness or palpable masses    Objective      /80 (BP Location: Left arm, Patient Position: Sitting, Cuff Size: Adult)   Pulse 70   Ht 5' 2" (1 575 m)   Wt 78 1 kg (172 lb 3 2 oz)   LMP  (LMP Unknown)   SpO2 100%   BMI 31 50 kg/m²      Physical Exam   Constitutional: She is oriented to person, place, and time  She appears well-developed and well-nourished  No distress  HENT:   Head: Normocephalic and atraumatic  Eyes: Pupils are equal, round, and reactive to light  Neck: Neck supple  No thyromegaly present  Pulmonary/Chest: Effort normal  Right breast exhibits no inverted nipple, no mass, no nipple discharge, no skin change and no tenderness  Left breast exhibits no inverted nipple, no mass, no nipple discharge, no skin change and no tenderness  Breasts are symmetrical    Abdominal: Soft  Normal appearance  She exhibits no mass  There is no tenderness  There is no CVA tenderness  Genitourinary: Rectum normal and uterus normal  Pelvic exam was performed with patient supine  No labial fusion  There is no rash, tenderness, lesion or injury on the right labia  There is no rash, tenderness, lesion or injury on the left labia  Uterus is not enlarged and not tender  Cervix exhibits no motion tenderness, no discharge and no friability  Right adnexum displays no mass, no tenderness and no fullness  Left adnexum displays no mass, no tenderness and no fullness   No erythema, tenderness or bleeding in the vagina  No foreign body in the vagina  No signs of injury around the vagina  No vaginal discharge found  Genitourinary Comments: Vulvar atrophy   Lymphadenopathy:     She has no cervical adenopathy  She has no axillary adenopathy  Right: No inguinal and no supraclavicular adenopathy present  Left: No inguinal and no supraclavicular adenopathy present  Neurological: She is alert and oriented to person, place, and time  She is not disoriented  Skin: Skin is warm, dry and intact  Psychiatric: She has a normal mood and affect   Her behavior is normal  Thought content normal

## 2019-07-22 LAB
HPV HR 12 DNA CVX QL NAA+PROBE: NEGATIVE
HPV16 DNA CVX QL NAA+PROBE: NEGATIVE
HPV18 DNA CVX QL NAA+PROBE: NEGATIVE

## 2019-07-25 LAB
LAB AP GYN PRIMARY INTERPRETATION: NORMAL
Lab: NORMAL

## 2019-08-08 ENCOUNTER — HOSPITAL ENCOUNTER (OUTPATIENT)
Dept: RADIOLOGY | Facility: HOSPITAL | Age: 54
Discharge: HOME | End: 2019-08-08
Attending: NURSE PRACTITIONER
Payer: COMMERCIAL

## 2019-08-08 VITALS — WEIGHT: 171 LBS | BODY MASS INDEX: 31.47 KG/M2 | HEIGHT: 62 IN

## 2019-08-08 DIAGNOSIS — Z12.31 ENCOUNTER FOR SCREENING MAMMOGRAM FOR BREAST CANCER: ICD-10-CM

## 2019-08-08 PROCEDURE — 77067 SCR MAMMO BI INCL CAD: CPT

## 2020-04-06 PROBLEM — C44.519 BASAL CELL CARCINOMA (BCC) OF SKIN OF TRUNK: Status: ACTIVE | Noted: 2019-05-17

## 2020-04-06 PROBLEM — R26.0 TITUBATION: Status: ACTIVE | Noted: 2019-08-06

## 2020-04-06 PROBLEM — R20.2 PARESTHESIA OF BOTH HANDS: Status: ACTIVE | Noted: 2017-01-11

## 2020-04-06 PROBLEM — L60.0 INGROWN NAIL: Status: ACTIVE | Noted: 2018-04-02

## 2020-04-06 PROBLEM — E04.2 MULTINODULAR GOITER: Status: ACTIVE | Noted: 2017-11-22

## 2020-04-06 PROBLEM — H66.92 LEFT ACUTE OTITIS MEDIA: Status: ACTIVE | Noted: 2017-01-11

## 2020-04-06 PROBLEM — K57.90 DIVERTICULOSIS: Status: ACTIVE | Noted: 2020-04-06

## 2020-04-06 PROBLEM — M79.672 PAIN IN LEFT FOOT: Status: ACTIVE | Noted: 2018-04-02

## 2020-04-06 PROBLEM — G56.01 CARPAL TUNNEL SYNDROME OF RIGHT WRIST: Status: ACTIVE | Noted: 2017-01-30

## 2020-04-06 PROBLEM — F41.8 SITUATIONAL ANXIETY: Status: ACTIVE | Noted: 2019-06-26

## 2020-04-06 PROBLEM — R26.81 GAIT INSTABILITY: Status: ACTIVE | Noted: 2019-08-06

## 2020-04-07 PROBLEM — T78.2XXA ANAPHYLACTIC SYNDROME: Status: ACTIVE | Noted: 2020-04-07

## 2020-06-25 ENCOUNTER — TELEPHONE (OUTPATIENT)
Dept: NEUROLOGY | Facility: CLINIC | Age: 55
End: 2020-06-25

## 2020-07-29 ENCOUNTER — CONSULT (OUTPATIENT)
Dept: NEUROLOGY | Facility: CLINIC | Age: 55
End: 2020-07-29
Payer: COMMERCIAL

## 2020-07-29 VITALS
DIASTOLIC BLOOD PRESSURE: 87 MMHG | TEMPERATURE: 97.4 F | HEART RATE: 99 BPM | WEIGHT: 180.5 LBS | HEIGHT: 62 IN | BODY MASS INDEX: 33.21 KG/M2 | SYSTOLIC BLOOD PRESSURE: 126 MMHG

## 2020-07-29 DIAGNOSIS — R25.9 INVOLUNTARY MOVEMENTS: Primary | ICD-10-CM

## 2020-07-29 PROCEDURE — 99244 OFF/OP CNSLTJ NEW/EST MOD 40: CPT | Performed by: PSYCHIATRY & NEUROLOGY

## 2020-07-29 NOTE — PATIENT INSTRUCTIONS
Will obtain MRI of the brain to see if there is an underlying cause for involuntary movements or swaying

## 2020-07-29 NOTE — ASSESSMENT & PLAN NOTE
Patient with mild truncal swaying which is constant during the exam and mild past pointing,  along with reports of gait ataxia and intermittent head jerks to the left which where not present on exam today  Symptoms are mild but this is all new over the past years with no triggers  Labs and prior imaging studies from 5 years ago reviewed  Given history and exam will obtain MRI of the brain to evaluate for underlying structural cerebellar lesions which could account for symptoms

## 2020-07-29 NOTE — PROGRESS NOTES
Patient ID: Sofya Bourgeois is a 54 y o  female  Assessment/Plan:    Involuntary movements  Patient with mild truncal swaying which is constant during the exam and mild past pointing,  along with reports of gait ataxia and intermittent head jerks to the left which where not present on exam today  Symptoms are mild but this is all new over the past years with no triggers  Labs and prior imaging studies from 5 years ago reviewed  Given history and exam will obtain MRI of the brain to evaluate for underlying structural cerebellar lesions which could account for symptoms  Diagnoses and all orders for this visit:    Involuntary movements  -     MRI brain without contrast; Future    Other orders  -     Acetaminophen (TYLENOL PO); Take by mouth as needed  -     Famotidine (PEPCID PO); Take by mouth 2 (two) times a day           Subjective:    Ms Ismael Mcclain is a woman with thyroid nodules, who presents for neurological evaluation for swaying and involuntary movements of the neck and body  About a year or so she noted involuntary swaying of the trunk  This is mild, present all day and varies  She can attempt to sit still but can not truly do so for more than a few seconds  She sways when seated but it may occur while standing as well  It has not necessarily gotten worse  She denies any changes in speech or swallowing  At times she feels as she veers to the left while walking  No recent falls  She denies having any associated vertiginous symptoms such as spinning of the room  No clear presyncopal symptoms  She denies any changes in vision  She has allergies and reports a throbbing "swoosh" sound at times  She also has slight, brief head jerks to the left lasting seconds  No premonitory symptoms  It typically occurs in isolation  No specific triggers  Frequency is sporadic, perhaps at least weekly  No associated pain, numbness or posturing  She has occasional headaches   Migraines have dissipated with her allergy treatment  She has occasional neck pain on the right side  Pain radiates into shoulder and upper back  She has has bilateral hand and palm tingling typically when using her hands, (doing hair, grasping)  She has been told this is carpal tunnel  EMG in 2017 with moderate carpal tunnel syndrome in both wrist R>L  Mri cervical spine in 2015 with C5-6 degenerative disc disease with annular bulging and right paracentral/foraminal disc protrusion/uncinate joint hypertrophic   change  Moderate canal stenosis with mild left and moderate right  MRI brain 2015- unremarkable          The following portions of the patient's history were reviewed and updated as appropriate: allergies, current medications, past family history, past medical history, past social history, past surgical history and problem list          Objective:    Blood pressure 126/87, pulse 99, temperature (!) 97 4 °F (36 3 °C), height 5' 2" (1 575 m), weight 81 9 kg (180 lb 8 oz)  Physical Exam   Constitutional: She appears well-developed  Eyes: Pupils are equal, round, and reactive to light  Cardiovascular:   No ankle edema   Neurological: She has normal strength and normal reflexes  Psychiatric: She has a normal mood and affect  Her speech is normal    Vitals reviewed  Neurological Exam  Mental Status   Oriented to person, place, time and situation  Recent and remote memory are intact  Speech is normal  Follows complex commands  Attention and concentration are normal     Cranial Nerves  CN II: Visual fields full to confrontation  Right funduscopic exam: not visualized  Left funduscopic exam: not visualized  CN III, IV, VI: Extraocular movements intact bilaterally  Pupils equal round and reactive to light bilaterally  CN V: Facial sensation is normal   CN VII: Full and symmetric facial movement    CN VIII: Hearing is normal   CN XI: Shoulder shrug strength is normal   Cranial nerve exam limited due to masking and patient comfort       Motor  Normal muscle bulk throughout  Normal muscle tone  Strength is 5/5 throughout all four extremities  Sensory  Light touch is normal in upper and lower extremities  Pinprick is normal in upper and lower extremities  Vibration is normal in upper and lower extremities  Reflexes  Deep tendon reflexes are 2+ and symmetric in all four extremities with downgoing toes bilaterally  Coordination  Right: Finger-to-nose normal  Rapid alternating movement normal   Left: Finger-to-nose normal  Rapid alternating movement normal   Slight past pointing  Gait  Casual gait is normal including stance, stride, and arm swing  Normal toe walking  Normal heel walking  Mild difficulty with 2 deviations in 10 steps    Able to rise from chair without using arms  ROS:    Review of Systems   Constitutional: Negative  Negative for appetite change and fever  HENT: Positive for tinnitus  Negative for hearing loss, trouble swallowing and voice change  Some Hoarseness  "Throbbing swoosh sound"  Snoring   Eyes: Negative for photophobia and pain  Puffy Red eyes   Respiratory: Negative  Negative for shortness of breath  Cardiovascular: Positive for palpitations (with Caffeine intake)  Gastrointestinal: Negative  Negative for nausea and vomiting  Endocrine: Negative for cold intolerance  Hair Loss   Loss of Sexual Drive     Genitourinary: Negative  Negative for dysuria, frequency and urgency  Musculoskeletal: Positive for back pain, joint swelling, myalgias and neck pain  Upper back pain while walking  Balance issues   Skin: Negative  Negative for rash  Allergic/Immunologic: Negative  Neurological: Positive for light-headedness and numbness (in fingers and legs)  Negative for dizziness, tremors, seizures, syncope, facial asymmetry, speech difficulty, weakness and headaches          Minor Cramping  Tingling in feet and legs and also moves around back Hematological: Negative  Does not bruise/bleed easily  Psychiatric/Behavioral: Positive for sleep disturbance (Trouble falling asleep)  Negative for confusion and hallucinations  The patient is nervous/anxious (Minor Anxiety)  All other systems reviewed and are negative  Review of system was personally reviewed

## 2020-08-18 ENCOUNTER — HOSPITAL ENCOUNTER (OUTPATIENT)
Dept: RADIOLOGY | Facility: HOSPITAL | Age: 55
Discharge: HOME | End: 2020-08-18
Attending: PSYCHIATRY & NEUROLOGY
Payer: COMMERCIAL

## 2020-08-18 DIAGNOSIS — R25.9 INVOLUNTARY MOVEMENTS: ICD-10-CM

## 2020-08-18 PROCEDURE — 70551 MRI BRAIN STEM W/O DYE: CPT

## 2020-08-25 ENCOUNTER — TELEPHONE (OUTPATIENT)
Dept: NEUROLOGY | Facility: CLINIC | Age: 55
End: 2020-08-25

## 2020-08-25 NOTE — TELEPHONE ENCOUNTER
Patient requested her brain MRI results  Informed patient that the impression was normal  Patient verbalized understanding

## 2021-03-08 ENCOUNTER — TRANSCRIBE ORDERS (OUTPATIENT)
Dept: PAIN MEDICINE | Facility: CLINIC | Age: 56
End: 2021-03-08

## 2021-03-08 ENCOUNTER — CONSULT (OUTPATIENT)
Dept: PAIN MEDICINE | Facility: CLINIC | Age: 56
End: 2021-03-08
Payer: COMMERCIAL

## 2021-03-08 VITALS
SYSTOLIC BLOOD PRESSURE: 144 MMHG | BODY MASS INDEX: 32.3 KG/M2 | HEART RATE: 73 BPM | DIASTOLIC BLOOD PRESSURE: 86 MMHG | WEIGHT: 176.6 LBS

## 2021-03-08 DIAGNOSIS — M48.04 THORACIC STENOSIS: ICD-10-CM

## 2021-03-08 DIAGNOSIS — M50.120 CERVICAL DISC DISORDER WITH RADICULOPATHY OF MID-CERVICAL REGION: Primary | ICD-10-CM

## 2021-03-08 DIAGNOSIS — M51.14 INTERVERTEBRAL DISC DISORDER WITH RADICULOPATHY OF THORACIC REGION: ICD-10-CM

## 2021-03-08 PROCEDURE — 99244 OFF/OP CNSLTJ NEW/EST MOD 40: CPT | Performed by: ANESTHESIOLOGY

## 2021-03-08 RX ORDER — METHOCARBAMOL 500 MG/1
500 TABLET, FILM COATED ORAL AS NEEDED
COMMUNITY
Start: 2021-01-13 | End: 2022-05-18

## 2021-03-08 NOTE — PROGRESS NOTES
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    3  Thoracic stenosis        Plan   the patient has been experiencing episodes of worsening incontinence with back pain  She has known history of thoracic stenosis secondary disc herniation as well as cervical disc herniation with severe stenosis so at this time I will order updated MRI of the cervical and thoracic spine to evaluate further  I advised her I will call the results and discuss treatment moving forward  My impressions and treatment recommendations were discussed in detail with the patient who verbalized understanding and had no further questions  Discharge instructions were provided  I personally saw and examined the patient and I agree with the above discussed plan of care  Orders Placed This Encounter   Procedures    MRI cervical spine without contrast     Standing Status:   Future     Standing Expiration Date:   3/8/2025     Scheduling Instructions: There is no preparation for this test  Please leave your jewelry and valuables at home, wedding rings are the exception  Magnetic nail polish must be removed prior to arrival for your test  Please bring your insurance cards, a form of photo ID and a list of your medications with you  Arrive 15 minutes prior to your appointment time in order to register  Please bring any prior CT or MRI studies of this area that were not performed at a [facility] facility  To schedule this appointment, please contact Central Scheduling at [phone]  Prior to your appointment, please make sure you complete the MRI Screening Form when you e-Check in for your appointment  This will be available starting 7 days before your appointment in [address]  You may receive an e-mail with an activation code if you do not have a [employer] account   If you do not have access to a device, we will complete your screening at your appointment  Order Specific Question:   Is the patient pregnant? Answer:   No     Order Specific Question:   What is the patient's sedation requirement? Answer:   No Sedation     Order Specific Question:   Release to patient through Mychart     Answer:   Immediate     Order Specific Question:   Is order priority selected as STAT? Answer:   No     Order Specific Question:   Reason for Exam (FREE TEXT)     Answer:   neck pain on the right    MRI thoracic spine wo contrast     Standing Status:   Future     Standing Expiration Date:   3/8/2025     Scheduling Instructions: There is no preparation for this test  Please leave your jewelry and valuables at home, wedding rings are the exception  Please bring your physician order, insurance cards, a form of photo ID and a list of your medications with you  Arrive 15 minutes prior to your appointment time in order to       register  Please bring any prior CT or MRI studies of this area that were not performed at a [facility]  To schedule this appointment, please contact Central Scheduling at [phone]  Order Specific Question:   What is the patient's sedation requirement? Answer:   No Sedation     Order Specific Question:   Is the patient pregnant? Answer:   No    Ambulatory referral to Physical Therapy     Standing Status:   Future     Standing Expiration Date:   3/8/2022     Referral Priority:   Routine     Referral Type:   Physical Therapy     Referral Reason:   Specialty Services Required     Requested Specialty:   Physical Therapy     Number of Visits Requested:   1     Expiration Date:   3/8/2022     No orders of the defined types were placed in this encounter        History of Present Illness    Ariella Jovel is a 54 y o  female referred by Dr Marquita Bunch who presents for consultation in regards to neck pain, upper back pain midback pain and lower back pain symptoms have been present for over 5 years  Pain is moderate to severe rated 5/10 on numeric rating scale and felt nearly constantly  Symptoms are dull, aching, shooting with pins and needles and warmth sensation down the legs  She denies any weakness  Symptoms are aggravated with physical activity including standing, bending, sitting and decreased with lying down  There is no change with coughing, sneezing or bowel movements  However, she does report that she has been having some episodes of urinary incontinence due to the pain being severe  Treatment history has included a combination of Motrin or total lack or Tylenol which she takes as needed  Physical therapy and exercises provided moderate relief  CBD ointment helps  She took a steroid pack in early January that was very helpful while she was on it  I have personally reviewed and/or updated the patient's past medical history, past surgical history, family history, social history, current medications, allergies, and vital signs today  Review of Systems   Constitutional: Positive for unexpected weight change  Negative for fever  HENT: Negative for trouble swallowing  Eyes: Positive for redness  Negative for visual disturbance  Respiratory: Negative for shortness of breath and wheezing  Cardiovascular: Positive for palpitations  Negative for chest pain  Gastrointestinal: Positive for abdominal pain  Negative for constipation, diarrhea, nausea and vomiting  Endocrine: Negative for cold intolerance, heat intolerance and polydipsia  Genitourinary: Positive for frequency  Negative for difficulty urinating  Musculoskeletal: Positive for back pain and gait problem  Negative for arthralgias, joint swelling and myalgias  Skin: Negative for rash  Neurological: Positive for dizziness and numbness  Negative for seizures, syncope, weakness and headaches  Hematological: Does not bruise/bleed easily     Psychiatric/Behavioral: Positive for decreased concentration  Negative for dysphoric mood  All other systems reviewed and are negative        Patient Active Problem List   Diagnosis    Allergic rhinitis    Allergic rhinitis due to pollen    Arthralgia of hip, left    Pain in left foot    Basal cell carcinoma (BCC) of skin of trunk    Degenerative lumbar disc    Carpal tunnel syndrome of right wrist    Common migraine    Degenerative lumbar spinal stenosis    Dietary calcium deficiency    Diverticulosis    Facet degeneration of lumbar region    Fibromyalgia    Foraminal stenosis of cervical region    Frequent UTI    Functional urinary incontinence    Gait instability    Herniated thoracic disc without myelopathy    Increased frequency of urination    Ingrown nail    Irritable colon    Knee pain    Lactose intolerance    Left acute otitis media    Left elbow pain    Thoracic back pain    Metrorrhagia    Mixed hyperlipidemia    Multinodular goiter    Paresthesia of both hands    Situational anxiety    SUZETTE (stress urinary incontinence, female)    Titubation    Urinary tract infection    Anaphylactic syndrome    Involuntary movements       Past Medical History:   Diagnosis Date    Abnormal Pap smear of cervix     Allergic rhinitis     Anaphylaxis     Arthritis     Back pain     due to herniated discs    Basal cell carcinoma (BCC) of skin of trunk 5/17/2019    Carpal tunnel syndrome     Female infertility     Fibromyalgia     GERD (gastroesophageal reflux disease)     Migraine     Multinodular goiter 11/22/2017    Seasonal allergies     Thyroid nodule     Varicella     Wears glasses     reading only        Past Surgical History:   Procedure Laterality Date    COLONOSCOPY      EGD      MAMMO STEREOTACTIC BREAST BIOPSY RIGHT (ALL INC) Right 04/09/2003    Benign    TUBAL LIGATION  1993    WISDOM TOOTH EXTRACTION         Family History   Problem Relation Age of Onset    Diabetes Mother     Hyperlipidemia Mother  Allergic rhinitis Mother     Parkinsonism Father     Diabetes Maternal Aunt     Hyperlipidemia Maternal Aunt     Allergic rhinitis Daughter     No Known Problems Daughter     Breast cancer Half-Sister 40    No Known Problems Brother     No Known Problems Sister     Breast cancer Sister     No Known Problems Brother     No Known Problems Brother     Thyroid disease unspecified Brother     Colon cancer Neg Hx     Uterine cancer Neg Hx     Ovarian cancer Neg Hx        Social History     Occupational History    Not on file   Tobacco Use    Smoking status: Never Smoker    Smokeless tobacco: Never Used   Substance and Sexual Activity    Alcohol use: No    Drug use: Never    Sexual activity: Yes     Partners: Male     Birth control/protection: None       Current Outpatient Medications on File Prior to Visit   Medication Sig    Acetaminophen (TYLENOL PO) Take by mouth as needed    ALPRAZolam (XANAX) 0 5 mg tablet 1/2 to 1tab 30mins before flight and may repeat dose if needed    calcium carbonate (Tums) 500 mg chewable tablet Chew 1 tablet daily    etodolac (LODINE) 200 MG capsule Take 200 mg by mouth every 8 (eight) hours    Famotidine (PEPCID PO) Take by mouth 2 (two) times a day    fexofenadine (ALLEGRA) 180 MG tablet Take 180 mg by mouth daily    MULTIPLE VITAMIN PO Take by mouth     No current facility-administered medications on file prior to visit  Allergies   Allergen Reactions    Omeprazole Anaphylaxis    Pantoprazole Anaphylaxis     ER VISIT 2015    Lidocaine Rash     With myalgias    D[employer] Aid [Lactase]     Levofloxacin Swelling     Throat swelling, GI intolerance, anxiety       Physical Exam    /86   Pulse 73   Wt 80 1 kg (176 lb 9 6 oz)   LMP  (LMP Unknown)   BMI 32 30 kg/m²     Constitutional: normal, well developed, well nourished, alert, in no distress and non-toxic and no overt pain behavior    Eyes: anicteric  HEENT: grossly intact  Neck: supple, symmetric, trachea midline and no masses   Pulmonary:even and unlabored  Cardiovascular:No edema or pitting edema present  Skin:Normal without rashes or lesions and well hydrated  Psychiatric:Mood and affect appropriate  Neurologic:Cranial Nerves II-XII grossly intact  Musculoskeletal:normal     Cervical Spine Exam  Appearance:  Normal lordosis  Palpation/Tenderness:  right cervical paraspinal tenderness  right trapezium tenderness  Range of Motion:  Flexion: Moderately limited  with pain  Extension:  Minimally limited  with pain  Rotation - Left:  Moderately limited  with pain  Rotation - Right:  Moderately limited  with pain  Motor Strength:  Left Arm Flexion  5/5  Left Arm Extension  5/5  Right Arm Flexion  5/5  Right Arm Extension  5/5  Left Wrist Flexion  5/5  Left Wrist Extension  5/5  Left Finger Abduction  5/5  Right Finger Abduction  5/5  Reflexes:  Left Biceps:  2+   Right Biceps:  2+   Left Triceps:  2+   Right Triceps:  2+     Lumbar Spine Exam  Appearance:  Normal lordosis  Palpation/Tenderness:  left lumbar paraspinal tenderness  right lumbar paraspinal tenderness  Range of Motion:  Flexion: Moderately limited  with pain  Extension:  Moderately limited  with pain  Lateral Flexion - Left:  Minimally limited  with pain  Lateral Flexion - Right:  Minimally limited  with pain  Motor Strength:  Left hip flexion:  5/5  Left hip extension:  5/5  Right hip flexion:  5/5  Right hip extension:  5/5  Left knee flexion:  5/5  Left knee extension:  5/5  Right knee flexion:  5/5  Right knee extension:  5/5  Left foot dorsiflexion:  5/5  Left foot plantar flexion:  5/5  Right foot dorsiflexion:  5/5  Right foot plantar flexion:  5/5  Reflexes:  Left Patellar:  2+   Right Patellar:  2+   Left Achilles:  2+   Right Achilles:  2+     Imaging    MRI CERVICAL SPINE WITHOUT CONTRAST  (9/10/2015)  INDICATION-  Neck pain  Bilateral upper extremity numbness, several   months  Brachial neuritis  723 4   COMPARISON-  None  TECHNIQUE-  Sagittal T1, sagittal T2, sagittal inversion recovery,   axial T2, axial 2-D merge  IMAGE QUALITY-  Diagnostic   FINDINGS-   ALIGNMENT-  Normal alignment of the cervical spine  No compression   fracture  No subluxation  No scoliosis  MARROW SIGNAL-  Normal marrow signal is identified within the   visualized bony structures  No discrete marrow lesion  CERVICAL AND VISUALIZED THORACIC CORD-  Normal signal within the   visualized cord  PREVERTEBRAL AND PARASPINAL SOFT TISSUES-  The thyroid gland is   slightly heterogeneous with numerous tiny nodules  VISUALIZED POSTERIOR FOSSA-  The visualized posterior fossa   demonstrates no abnormal signal    CERVICAL DISC SPACES-        C2-C3-  Normal    C3-C4-  Normal    C4-C5-  Normal    C5-C6-  Slight loss of disc height  Mild diffuse annular bulging with   a small broad-based right paracentral and foraminal disc protrusion and   asymmetric uncinate joint hypertrophic change  Moderate canal stenosis   with slight flattening of the cervical cord  Mild left and moderate   right foraminal narrowing  C6-C7-  Tiny central disc protrusion  No canal stenosis or foraminal   narrowing  C7-T1-  Normal    UPPER THORACIC DISC SPACES-  Normal      MRI LUMBAR SPINE WITHOUT CONTRAST  (11/20/2015)     INDICATION-  Severe back pain  M54 5      COMPARISON-  None  TECHNIQUE-  Sagittal T1, sagittal T2, sagittal inversion recovery,   axial T1 and axial T2, coronal T2         IMAGE QUALITY-  Diagnostic      FINDINGS-      ALIGNMENT-  Normal alignment of the lumbar spine  No acute compression   fracture  Chronic Schmorl's nodes noted within the L1 superior   endplate and to a lesser degree within the L2 superior endplate  No   spondylolysis or spondylolisthesis  No scoliosis  MARROW SIGNAL-  Small L5 hemangioma  Otherwise normal marrow signal   noted  DISTAL CORD AND CONUS-  Normal signal within the distal cord and conus     The conus ends at the L2 level  PARASPINAL SOFT TISSUES-  Paraspinal soft tissues are unremarkable  SACRUM-  Dilated sacral neural foramen with S1, S2 and S3 cystic masses   extending from the central canal through the neural foramen, seen on   sagittal imaging only  Findings most suggestive of root sleeve cysts  However, peripheral nerve sheath tumors could have this appearance and   postcontrast imaging is recommended  LOWER THORACIC DISC SPACES-  At T10-11 there is disc desiccation  Annular bulging with a small central disc herniation  Moderate canal   stenosis noted  No foraminal narrowing  Minimal degenerative disc   disease at T11-12  LUMBAR DISC SPACES-           L1-L2-  Normal       L2-L3-  Slight disc desiccation  Mild annular bulging diffusely  Mild   canal stenosis and foraminal narrowing without nerve impingement  L3-L4-  Disc desiccation with mild diffuse annular bulging  Mild to   moderate facet hypertrophic degenerative change  Mild to moderate   canal stenosis with mild bilateral foraminal narrowing  L4-L5-  Disc desiccation without loss of disc height  Mild diffuse   annular bulging  Small broad-based right foraminal disc protrusion  Facet degenerative change with moderate canal stenosis  Mild left   foraminal narrowing at slightly more pronounced right foraminal   narrowing  L5-S1-  Normal disc height and signal   No canal stenosis or foraminal   narrowing

## 2021-03-08 NOTE — PATIENT INSTRUCTIONS

## 2021-03-11 ENCOUNTER — TELEPHONE (OUTPATIENT)
Dept: PAIN MEDICINE | Facility: CLINIC | Age: 56
End: 2021-03-11

## 2021-03-11 DIAGNOSIS — M48.062 LUMBAR STENOSIS WITH NEUROGENIC CLAUDICATION: Primary | ICD-10-CM

## 2021-03-11 DIAGNOSIS — G89.4 CHRONIC PAIN SYNDROME: ICD-10-CM

## 2021-03-11 NOTE — TELEPHONE ENCOUNTER
Patient called requesting steroid medication discussed over last OVS can be called into her pharmacy  She doesn't recall the name of the medication   Please advise,vivian    Call back# [phone]

## 2021-03-12 NOTE — TELEPHONE ENCOUNTER
RN s/w pt and she said she thought  was going to send a steroid script to her pharmacy  Pt said she was in the office on Monday  I reviewed ov note from 3/8 no mention of ordering a steroid dosepak  Rn told pt that  is out of office until Monday but I could reach out to the on call provider today  Pt said it can wait until FQ returns on Monday

## 2021-03-15 RX ORDER — METHYLPREDNISOLONE 4 MG/1
TABLET ORAL
Qty: 21 TABLET | Refills: 0 | Status: SHIPPED | OUTPATIENT
Start: 2021-03-15 | End: 2021-05-27 | Stop reason: ALTCHOICE

## 2021-03-15 NOTE — TELEPHONE ENCOUNTER
E-rx sent for medrol dosepak to her [facility]    Will call her with results of MRI once they are completed

## 2021-03-17 NOTE — TELEPHONE ENCOUNTER
Left detailed vm for pt (ok per SHARATH) that steroid dosepak was sent to her [facility], it is a 6 day pack of steroids, instructions on how to take are in package insert, do not take any OTC NSAIDS while taking steroid pack  FQ will call pt with results of MRIs once completed  Pt to c/b if has any questions related to this message

## 2021-03-20 ENCOUNTER — HOSPITAL ENCOUNTER (OUTPATIENT)
Dept: RADIOLOGY | Facility: HOSPITAL | Age: 56
Discharge: HOME | End: 2021-03-20
Attending: ANESTHESIOLOGY
Payer: COMMERCIAL

## 2021-03-20 DIAGNOSIS — M51.14 INTERVERTEBRAL DISC DISORDER WITH RADICULOPATHY OF THORACIC REGION: ICD-10-CM

## 2021-03-20 PROCEDURE — G1004 CDSM NDSC: HCPCS

## 2021-03-20 PROCEDURE — 72146 MRI CHEST SPINE W/O DYE: CPT

## 2021-03-22 ENCOUNTER — HOSPITAL ENCOUNTER (OUTPATIENT)
Dept: RADIOLOGY | Facility: HOSPITAL | Age: 56
Discharge: HOME | End: 2021-03-22
Attending: ANESTHESIOLOGY
Payer: COMMERCIAL

## 2021-03-22 DIAGNOSIS — M50.120 CERVICAL DISC DISORDER WITH RADICULOPATHY OF MID-CERVICAL REGION: ICD-10-CM

## 2021-03-22 PROCEDURE — 72141 MRI NECK SPINE W/O DYE: CPT

## 2021-03-22 PROCEDURE — G1004 CDSM NDSC: HCPCS

## 2021-03-25 ENCOUNTER — TELEPHONE (OUTPATIENT)
Dept: PAIN MEDICINE | Facility: CLINIC | Age: 56
End: 2021-03-25

## 2021-03-25 DIAGNOSIS — M50.120 CERVICAL DISC DISORDER WITH RADICULOPATHY OF MID-CERVICAL REGION: Primary | ICD-10-CM

## 2021-03-25 NOTE — TELEPHONE ENCOUNTER
Discussed MRI cervical spine results with patient which shows C5-6 disc herniation with stenosis  Recommended proceeding with cervical epidural steroid injection which she would like to do  Order placed  In addition, discussed MRI thoracic spine results with patient which shows lower thoracic disc bulging and mild to moderate stenosis  Also advised that 2 3 cm thyroid mass was seen  She reports that this has previously been biopsied that she was recommended to follow back up with her PCP as well

## 2021-03-25 NOTE — TELEPHONE ENCOUNTER
Gabino Sandhoff from radiology called stating MRI results have significant findings   Please review, thx    Call back# [phone] opt 3

## 2021-04-07 ENCOUNTER — TELEPHONE (OUTPATIENT)
Dept: PAIN MEDICINE | Facility: CLINIC | Age: 56
End: 2021-04-07

## 2021-04-12 ENCOUNTER — OFFICE VISIT (OUTPATIENT)
Dept: PAIN MEDICINE | Facility: CLINIC | Age: 56
End: 2021-04-12
Payer: COMMERCIAL

## 2021-04-12 ENCOUNTER — TELEPHONE (OUTPATIENT)
Dept: NEUROLOGY | Facility: CLINIC | Age: 56
End: 2021-04-12

## 2021-04-12 VITALS — HEART RATE: 95 BPM | DIASTOLIC BLOOD PRESSURE: 84 MMHG | SYSTOLIC BLOOD PRESSURE: 134 MMHG

## 2021-04-12 DIAGNOSIS — M51.16 INTERVERTEBRAL DISC DISORDER WITH RADICULOPATHY OF LUMBAR REGION: Primary | ICD-10-CM

## 2021-04-12 DIAGNOSIS — M50.120 CERVICAL DISC DISORDER WITH RADICULOPATHY OF MID-CERVICAL REGION: ICD-10-CM

## 2021-04-12 DIAGNOSIS — M51.14 INTERVERTEBRAL DISC DISORDER WITH RADICULOPATHY OF THORACIC REGION: ICD-10-CM

## 2021-04-12 PROCEDURE — 99214 OFFICE O/P EST MOD 30 MIN: CPT | Performed by: ANESTHESIOLOGY

## 2021-04-12 NOTE — PROGRESS NOTES
Assessment:  1  Intervertebral disc disorder with radiculopathy of lumbar region    2  Cervical disc disorder with radiculopathy of mid-cervical region    3  Intervertebral disc disorder with radiculopathy of thoracic region        Plan:  The patient has ongoing pain in her cervical and thoracic spine related to her C5-6 disc bulging, spondylosis and stenosis as well as multiple disc bulges in the mid to lower thoracic spine  at this time, she will be sent for physical therapy  If she is not symptomatically better, I did discuss performing a cervical epidural steroid injection which would target her main pain generator  My impressions and treatment recommendations were discussed in detail with the patient who verbalized understanding and had no further questions  Discharge instructions were provided  I personally saw and examined the patient and I agree with the above discussed plan of care  Orders Placed This Encounter   Procedures    Ambulatory referral to Physical Therapy     Standing Status:   Future     Standing Expiration Date:   4/12/2022     Referral Priority:   Routine     Referral Type:   Physical Therapy     Referral Reason:   Specialty Services Required     Requested Specialty:   Physical Therapy     Number of Visits Requested:   1     Expiration Date:   4/12/2022     No orders of the defined types were placed in this encounter  History of Present Illness:  Wilberto Lovell is a 64 y o  female who presents for a follow up office visit in regards to Back Pain (review MRI results) and Neck Pain  The patient was previously seen on 03/08/2021 for chronic neck and back pain at which time MRI cervical and thoracic spines were ordered  She reports ongoing pain in the neck that travels into the shoulder region and into the shoulder blade which is most bothersome for her but also pain in the  lower mid back around the braline which is also dull/ aching and throbbing     Symptoms are moderate to severe rated 5-7/10 on a numeric rating scale  MRI cervical spine was reviewed with the patient showing disc bulging and stenosis at C5-6  MRI thoracic spine was reviewed with the patient showing multiple disc herniations in the lower thoracic specifically T9-10, T10-11 and T11-12 with moderate stenosis in the T10-11 and T11-12  I have personally reviewed and/or updated the patient's past medical history, past surgical history, family history, social history, current medications, allergies, and vital signs today  Review of Systems   Respiratory: Negative for shortness of breath  Cardiovascular: Negative for chest pain  Gastrointestinal: Negative for constipation, diarrhea, nausea and vomiting  Musculoskeletal: Positive for back pain and neck pain  Negative for arthralgias, gait problem, joint swelling and myalgias  Skin: Negative for rash  Neurological: Negative for dizziness, seizures and weakness  All other systems reviewed and are negative        Patient Active Problem List   Diagnosis    Allergic rhinitis    Allergic rhinitis due to pollen    Arthralgia of hip, left    Pain in left foot    Basal cell carcinoma (BCC) of skin of trunk    Degenerative lumbar disc    Carpal tunnel syndrome of right wrist    Common migraine    Degenerative lumbar spinal stenosis    Dietary calcium deficiency    Diverticulosis    Facet degeneration of lumbar region    Fibromyalgia    Foraminal stenosis of cervical region    Frequent UTI    Functional urinary incontinence    Gait instability    Herniated thoracic disc without myelopathy    Increased frequency of urination    Ingrown nail    Irritable colon    Knee pain    Lactose intolerance    Left acute otitis media    Left elbow pain    Thoracic back pain    Metrorrhagia    Mixed hyperlipidemia    Multinodular goiter    Paresthesia of both hands    Situational anxiety    SUZETTE (stress urinary incontinence, female)  Titubation    Urinary tract infection    Anaphylactic syndrome    Involuntary movements       Past Medical History:   Diagnosis Date    Abnormal Pap smear of cervix     Allergic rhinitis     Anaphylaxis     Arthritis     Back pain     due to herniated discs    Basal cell carcinoma (BCC) of skin of trunk 5/17/2019    Carpal tunnel syndrome     Female infertility     Fibromyalgia     GERD (gastroesophageal reflux disease)     Migraine     Multinodular goiter 11/22/2017    Seasonal allergies     Thyroid nodule     Varicella     Wears glasses     reading only        Past Surgical History:   Procedure Laterality Date    COLONOSCOPY      EGD      MAMMO STEREOTACTIC BREAST BIOPSY RIGHT (ALL INC) Right 04/09/2003    Benign    TUBAL LIGATION  1993    WISDOM TOOTH EXTRACTION         Family History   Problem Relation Age of Onset    Diabetes Mother     Hyperlipidemia Mother     Allergic rhinitis Mother     Parkinsonism Father     Diabetes Maternal Aunt     Hyperlipidemia Maternal Aunt     Allergic rhinitis Daughter     No Known Problems Daughter     Breast cancer Half-Sister 40    No Known Problems Brother     No Known Problems Sister     Breast cancer Sister     No Known Problems Brother     No Known Problems Brother     Thyroid disease unspecified Brother     Colon cancer Neg Hx     Uterine cancer Neg Hx     Ovarian cancer Neg Hx        Social History     Occupational History    Not on file   Tobacco Use    Smoking status: Never Smoker    Smokeless tobacco: Never Used   Substance and Sexual Activity    Alcohol use: No    Drug use: Never    Sexual activity: Yes     Partners: Male     Birth control/protection: None       Current Outpatient Medications on File Prior to Visit   Medication Sig    Acetaminophen (TYLENOL PO) Take by mouth as needed    ALPRAZolam (XANAX) 0 5 mg tablet 1/2 to 1tab 30mins before flight and may repeat dose if needed    calcium carbonate (Tums) 500 mg chewable tablet Chew 1 tablet daily    etodolac (LODINE) 200 MG capsule Take 200 mg by mouth every 8 (eight) hours    Famotidine (PEPCID PO) Take by mouth 2 (two) times a day    fexofenadine (ALLEGRA) 180 MG tablet Take 180 mg by mouth daily    methocarbamol (ROBAXIN) 500 mg tablet Take 500 mg by mouth as needed    methylPREDNISolone 4 MG tablet therapy pack Use as directed on package    MULTIPLE VITAMIN PO Take by mouth     No current facility-administered medications on file prior to visit  Allergies   Allergen Reactions    Omeprazole Anaphylaxis    Pantoprazole Anaphylaxis     ER VISIT 2015    Lidocaine Rash     With myalgias    Dairy Aid [Lactase]     Levofloxacin Swelling     Throat swelling, GI intolerance, anxiety       Physical Exam:    /84   Pulse 95   LMP  (LMP Unknown)     Constitutional:normal, well developed, well nourished, alert, in no distress and non-toxic and no overt pain behavior  Eyes:anicteric  HEENT:grossly intact  Neck:supple, symmetric, trachea midline and no masses   Pulmonary:even and unlabored  Cardiovascular:No edema or pitting edema present  Skin:Normal without rashes or lesions and well hydrated  Psychiatric:Mood and affect appropriate  Neurologic:Cranial Nerves II-XII grossly intact  Musculoskeletal:normal    Imaging    MRI CERVICAL SPINE WITHOUT CONTRAST (3/22/2021)     INDICATION: M50 120: Mid-cervical disc disorder, unspecified level      COMPARISON:  9/9/2015     TECHNIQUE:  Sagittal T1, sagittal T2, sagittal inversion recovery, axial T2, axial  2D merge     IMAGE QUALITY:  Diagnostic     FINDINGS:     ALIGNMENT:  Normal alignment of the cervical spine  No compression fracture  No subluxation    No scoliosis      MARROW SIGNAL:  Small hemangioma within the T2 vertebral body      CERVICAL AND VISUALIZED THORACIC CORD:  Normal signal within the visualized cord      PREVERTEBRAL AND PARASPINAL SOFT TISSUES: Slightly heterogeneous thyroid gland with small nodules identified bilaterally  The largest of these is seen within the lower pole of the left lobe of the gland measuring just over 1 cm in maximum dimension  Incidental discovery of one or more thyroid nodule(s) measuring less than 1 5 cm and without suspicious features is noted in this patient who is above 28years old; according to guidelines published in the February 2015 white paper on incidental thyroid   nodules in the Journal of the [employer]), no further evaluation is recommended       VISUALIZED POSTERIOR FOSSA:  The visualized posterior fossa demonstrates no abnormal signal      CERVICAL DISC SPACES:     C2-C3:  Normal      C3-C4:  Normal      C4-C5:  Normal      C5-C6:  There is loss of disc height with mild annular bulging  There is a broad-based right paramedian and foraminal disc herniation with bilateral uncinate joint hypertrophic osteophyte formation, right greater than left  Overall there is mild canal   stenosis with minimal flattening of the right lateral aspect of the cord ventrally  Mild left and moderate right foraminal narrowing  Findings are similar to prior exam      C6-C7:  Tiny central disc herniation unchanged from the prior examination without canal stenosis or foraminal narrowing      C7-T1:  Normal      UPPER THORACIC DISC SPACES:  Normal      IMPRESSION:     Cervical degenerative disc disease most pronounced at the C5-6 level is similar to the prior examination with right greater than left foraminal narrowing and canal stenosis  No abnormal cord signal         MRI THORACIC SPINE WITHOUT CONTRAST (3/22/2021)     INDICATION: M51 14: Intervertebral disc disorders with radiculopathy, thoracic region      COMPARISON:  None      TECHNIQUE:  Sagittal T1, sagittal T2, sagittal inversion recovery, axial T2,  axial 2D MERGE      IMAGE QUALITY: Diagnostic      FINDINGS:     ALIGNMENT: Normal alignment of the thoracic spine  No compression fracture  No subluxation  No scoliosis      MARROW SIGNAL:  Scattered degenerative endplate changes  No focally suspicious marrow lesions  No bone marrow edema or compression abnormality  Hemangioma in the T2 vertebra noted there is also a hemangioma in the T8 vertebra      THORACIC CORD: Normal signal within the thoracic cord      PARAVERTEBRAL SOFT TISSUES: Projecting from the undersurface left side of the thyroid gland there is a possible 2 3 cm mass image 5, series 8  Thyroid gland is otherwise heterogeneous and bulbous in appearance  Incidental discovery of one or more   thyroid nodule(s) measuring more than 1 5 cm and without suspicious features is noted in this patient who is above 28years old; according to guidelines published in the February 2015 white paper on incidental thyroid nodules in the Journal of the   [employer]iology [facility]), further characterization with thyroid ultrasound is recommended      THORACIC DEGENERATIVE CHANGE: T8-T9: Small central disc herniation, protrusion type  No significant central canal or neural foraminal narrowing      T9-T10: There is a small central disc herniation, protrusion type  Mild to moderate central canal narrowing  Neural foramina patent bilaterally      T10-T11: There is a central disc herniation, protrusion type  There is moderate central canal narrowing  Mild bilateral neural foraminal narrowing      T11-T12: There is mild loss of disc height and signal   There is a small central disc protrusion  Neural foramina patent bilaterally      IMPRESSION:     1    Scattered multilevel spondylosis most pronounced at T10-T11  No cord compression or cord signal abnormality  2   Heterogeneous bulbous appearing thyroid gland  Nonemergent thyroid ultrasound suggested for further assessment      Incidental thyroid nodule(s) for which nonemergent thyroid ultrasound is recommended

## 2021-04-12 NOTE — TELEPHONE ENCOUNTER
lvm for patient to schedule sooner appointment with Barry Diaz - availability 4/14 @ 2:45p in [place] - please assist if still available

## 2021-04-23 ENCOUNTER — TELEPHONE (OUTPATIENT)
Dept: PAIN MEDICINE | Facility: CLINIC | Age: 56
End: 2021-04-23

## 2021-04-23 NOTE — TELEPHONE ENCOUNTER
Patient called requesting to have an injection done for her upper back pain   Please advise, vivian    Call back# [phone]

## 2021-04-27 ENCOUNTER — TELEPHONE (OUTPATIENT)
Dept: NEUROLOGY | Facility: CLINIC | Age: 56
End: 2021-04-27

## 2021-04-27 NOTE — TELEPHONE ENCOUNTER
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

## 2021-04-29 ENCOUNTER — TELEPHONE (OUTPATIENT)
Dept: RADIOLOGY | Facility: CLINIC | Age: 56
End: 2021-04-29

## 2021-04-29 DIAGNOSIS — M50.120 CERVICAL DISC DISORDER WITH RADICULOPATHY OF MID-CERVICAL REGION: Primary | ICD-10-CM

## 2021-04-29 NOTE — TELEPHONE ENCOUNTER
S/w pt, she said she is in the process of moving and her pain is increasing so she would like to proceed with the TEREZA that was discussed at her last visit  Pt said she decided to not start PT and wants to proceed with the injection instead

## 2021-04-30 NOTE — TELEPHONE ENCOUNTER
Left message for patient to call me back directly to schedule procedure TEREZA at [place] [id number]

## 2021-04-30 NOTE — TELEPHONE ENCOUNTER
Scheduled pt for TEREZA for 5/21/21  Pt denies rx blood thinners  Pt takes Lodine on occasion and has been instructed to hold for 4 days with last dose on 5/22/21   Pt also takes Motrin on occasion and has been instructed to hold for 1 day with last dose on 5/25/21  Went over pre-procedure instructions below:  Nothing to eat or drink 1 hr prior to procedure  Need to arrange transportation  Proper clothing for procedure  If ill or placed on antibiotics please call to reschedule  Covid/travel/ and vaccine instructions

## 2021-04-30 NOTE — TELEPHONE ENCOUNTER
Patient called returning procedure  call  Please be advise thank you        Please call patient back @ [phone]

## 2021-05-27 ENCOUNTER — HOSPITAL ENCOUNTER (OUTPATIENT)
Dept: RADIOLOGY | Facility: CLINIC | Age: 56
Discharge: HOME | End: 2021-05-27
Attending: ANESTHESIOLOGY | Admitting: ANESTHESIOLOGY
Payer: COMMERCIAL

## 2021-05-27 VITALS
SYSTOLIC BLOOD PRESSURE: 133 MMHG | OXYGEN SATURATION: 96 % | DIASTOLIC BLOOD PRESSURE: 83 MMHG | HEART RATE: 85 BPM | TEMPERATURE: 97.9 F | RESPIRATION RATE: 20 BRPM

## 2021-05-27 DIAGNOSIS — M50.120 CERVICAL DISC DISORDER WITH RADICULOPATHY OF MID-CERVICAL REGION: ICD-10-CM

## 2021-05-27 PROCEDURE — 62321 NJX INTERLAMINAR CRV/THRC: CPT | Performed by: ANESTHESIOLOGY

## 2021-05-27 RX ORDER — METHYLPREDNISOLONE ACETATE 80 MG/ML
80 INJECTION, SUSPENSION INTRA-ARTICULAR; INTRALESIONAL; INTRAMUSCULAR; PARENTERAL; SOFT TISSUE ONCE
Status: COMPLETED | OUTPATIENT
Start: 2021-05-27 | End: 2021-05-27

## 2021-05-27 RX ORDER — IBUPROFEN 200 MG
TABLET ORAL EVERY 6 HOURS PRN
COMMUNITY

## 2021-05-27 RX ADMIN — METHYLPREDNISOLONE ACETATE 80 MG: 80 INJECTION, SUSPENSION INTRA-ARTICULAR; INTRALESIONAL; INTRAMUSCULAR; PARENTERAL; SOFT TISSUE at 10:08

## 2021-05-27 RX ADMIN — IOHEXOL 1 ML: 300 INJECTION, SOLUTION INTRAVENOUS at 10:07

## 2021-05-27 NOTE — DISCHARGE INSTR - LAB
Epidural Steroid Injection   WHAT YOU NEED TO KNOW:   An epidural steroid injection (JANICE) is a procedure to inject steroid medicine into the epidural space  The epidural space is between your spinal cord and vertebrae  Steroids reduce inflammation and fluid buildup in your spine that may be causing pain  You may be given pain medicine along with the steroids  ACTIVITY  · Do not drive or operate machinery today  · No strenuous activity today - bending, lifting, etc   · You may resume normal activites starting tomorrow - start slowly and as tolerated  · You may shower today, but no tub baths or hot tubs  · You may have numbness for several hours from the local anesthetic  Please use caution and common sense, especially with weight-bearing activities  CARE OF THE INJECTION SITE  · If you have soreness or pain, apply ice to the area today (20 minutes on/20 minutes off)  · Starting tomorrow, you may use warm, moist heat or ice if needed  · You may have an increase or change in your discomfort for 36-48 hours after your treatment  · Apply ice and continue with any pain medication you have been prescribed  · [facility] if you have any of the following: redness, drainage, swelling, headache, stiff neck or fever above 100°F     SPECIAL INSTRUCTIONS  · Our office will contact you in approximately 7 days for a progress report  MEDICATIONS  · Continue to take all routine medications  · Our office may have instructed you to hold some medications  As no general anesthesia was used in today's procedure, you should not experience any side effects related to anesthesia  If you have a problem specifically related to your procedure, please call our office at [phone]  Problems not related to your procedure should be directed to your primary care physician

## 2021-05-27 NOTE — H&P
History of Present Illness: The patient is a 64 y o  female who presents with complaints of neck pain secondary to cervical disc bulging and stenosis and is here today for cervical epidural steroid injection      Patient Active Problem List   Diagnosis    Allergic rhinitis    Allergic rhinitis due to pollen    Arthralgia of hip, left    Pain in left foot    Basal cell carcinoma (BCC) of skin of trunk    Degenerative lumbar disc    Carpal tunnel syndrome of right wrist    Common migraine    Degenerative lumbar spinal stenosis    Dietary calcium deficiency    Diverticulosis    Facet degeneration of lumbar region    Fibromyalgia    Foraminal stenosis of cervical region    Frequent UTI    Functional urinary incontinence    Gait instability    Herniated thoracic disc without myelopathy    Increased frequency of urination    Ingrown nail    Irritable colon    Knee pain    Lactose intolerance    Left acute otitis media    Left elbow pain    Thoracic back pain    Metrorrhagia    Mixed hyperlipidemia    Multinodular goiter    Paresthesia of both hands    Situational anxiety    SUZETTE (stress urinary incontinence, female)    Titubation    Urinary tract infection    Anaphylactic syndrome    Involuntary movements       Past Medical History:   Diagnosis Date    Abnormal Pap smear of cervix     Allergic rhinitis     Anaphylaxis     Arthritis     Back pain     due to herniated discs    Basal cell carcinoma (BCC) of skin of trunk 5/17/2019    Carpal tunnel syndrome     Female infertility     Fibromyalgia     GERD (gastroesophageal reflux disease)     Migraine     Multinodular goiter 11/22/2017    Seasonal allergies     Thyroid nodule     Varicella     Wears glasses     reading only        Past Surgical History:   Procedure Laterality Date    COLONOSCOPY      EGD      MAMMO STEREOTACTIC BREAST BIOPSY RIGHT (ALL INC) Right 04/09/2003    Benign    TUBAL LIGATION  1993    ROSALINA TOOTH EXTRACTION           Current Outpatient Medications:     ibuprofen (MOTRIN) 200 mg tablet, Take by mouth every 6 (six) hours as needed for mild pain (pt takes (2) as needed), Disp: , Rfl:     Acetaminophen (TYLENOL PO), Take by mouth as needed, Disp: , Rfl:     ALPRAZolam (XANAX) 0 5 mg tablet, 1/2 to 1tab 30mins before flight and may repeat dose if needed, Disp: , Rfl:     calcium carbonate (Tums) 500 mg chewable tablet, Chew 1 tablet daily, Disp: , Rfl:     etodolac (LODINE) 200 MG capsule, Take 200 mg by mouth every 8 (eight) hours, Disp: , Rfl:     Famotidine (PEPCID PO), Take by mouth 2 (two) times a day, Disp: , Rfl:     fexofenadine (ALLEGRA) 180 MG tablet, Take 180 mg by mouth daily, Disp: , Rfl:     methocarbamol (ROBAXIN) 500 mg tablet, Take 500 mg by mouth as needed, Disp: , Rfl:     MULTIPLE VITAMIN PO, Take by mouth, Disp: , Rfl:     Current Facility-Administered Medications:     iohexol (OMNIPAQUE) 300 mg/mL injection 50 mL, 50 mL, Epidural, Once, Marcelino Hernandez MD    methylPREDNISolone acetate (DEPO-MEDROL) injection 80 mg, 80 mg, Epidural, Once, Marcelino Hernandez MD    Allergies   Allergen Reactions    Omeprazole Anaphylaxis    Pantoprazole Anaphylaxis     ER VISIT 2015    Lidocaine Rash     With myalgias    Dairy Aid [Lactase]     Levofloxacin Swelling     Throat swelling, GI intolerance, anxiety       Physical Exam:   Vitals:    05/27/21 0952   BP: 137/83   Pulse: 86   Resp: 20   Temp: 97 9 °F (36 6 °C)   SpO2: 96%     General: Awake, Alert, Oriented x 3, Mood and affect appropriate  Respiratory: Respirations even and unlabored  Cardiovascular: Peripheral pulses intact; no edema  Musculoskeletal Exam:  Neck pain    ASA Score: 2    Patient/Chart Verification  Patient ID Verified: Verbal  Consents Confirmed: To be obtained in the Pre-Procedure area  H&P( within 30 days) Verified: To be obtained in the Pre-Procedure area  Allergies Reviewed:  Yes  Anticoag/NSAID held?: Yes(no motrin for 24 hrs, no etodolac for 4 days)  Currently on antibiotics?: No    Assessment:   1   Cervical disc disorder with radiculopathy of mid-cervical region        Plan: TEREZA

## 2021-06-03 ENCOUNTER — TELEPHONE (OUTPATIENT)
Dept: PAIN MEDICINE | Facility: CLINIC | Age: 56
End: 2021-06-03

## 2021-06-03 NOTE — TELEPHONE ENCOUNTER
Pt reports 50% improvement post inj   She said she is having a lot of pain in her lower back  She wanted to know if she could have an injection there

## 2021-06-03 NOTE — TELEPHONE ENCOUNTER
Sure we can do an LESI at L4 level, but does she think it's her lower back or her lower thoracic that's bothering her?

## 2021-06-09 NOTE — TELEPHONE ENCOUNTER
Call transferred from phone room, pt said he pain is in the middle of her back, at the bra strap level

## 2021-06-09 NOTE — TELEPHONE ENCOUNTER
Attempted to reach pt x2 with no answer, would you like us to send a letter or complete task and wait a c/b?

## 2021-06-10 ENCOUNTER — TELEPHONE (OUTPATIENT)
Dept: RADIOLOGY | Facility: CLINIC | Age: 56
End: 2021-06-10

## 2021-06-10 DIAGNOSIS — M51.14 INTERVERTEBRAL DISC DISORDER WITH RADICULOPATHY OF THORACIC REGION: Primary | ICD-10-CM

## 2021-06-10 NOTE — TELEPHONE ENCOUNTER
Scheduled pt for TESI for 7/15/21  Pt denies rx blood thinners  Pt takes Lodine and was instructed to hold for 4 days with last dose on 1/10/21 and Ibupofen for 1 day hold with last dose on 7/13/21  Went over pre-procedure instructions below:  Nothing to eat or drink 1 hr prior to procedure  Need to arrange transportation  Proper clothing for procedure  If ill or placed on antibiotics please call to reschedule  Covid/travel/ and vaccine instructions

## 2021-07-15 ENCOUNTER — HOSPITAL ENCOUNTER (OUTPATIENT)
Dept: RADIOLOGY | Facility: CLINIC | Age: 56
Discharge: HOME | End: 2021-07-15
Attending: ANESTHESIOLOGY
Payer: COMMERCIAL

## 2021-07-15 VITALS
DIASTOLIC BLOOD PRESSURE: 82 MMHG | SYSTOLIC BLOOD PRESSURE: 144 MMHG | HEART RATE: 79 BPM | TEMPERATURE: 98.3 F | RESPIRATION RATE: 20 BRPM | OXYGEN SATURATION: 95 %

## 2021-07-15 DIAGNOSIS — M51.14 INTERVERTEBRAL DISC DISORDER WITH RADICULOPATHY OF THORACIC REGION: ICD-10-CM

## 2021-07-15 PROCEDURE — 62321 NJX INTERLAMINAR CRV/THRC: CPT | Performed by: ANESTHESIOLOGY

## 2021-07-15 RX ORDER — METHYLPREDNISOLONE ACETATE 80 MG/ML
80 INJECTION, SUSPENSION INTRA-ARTICULAR; INTRALESIONAL; INTRAMUSCULAR; PARENTERAL; SOFT TISSUE ONCE
Status: COMPLETED | OUTPATIENT
Start: 2021-07-15 | End: 2021-07-15

## 2021-07-15 RX ADMIN — METHYLPREDNISOLONE ACETATE 80 MG: 80 INJECTION, SUSPENSION INTRA-ARTICULAR; INTRALESIONAL; INTRAMUSCULAR; PARENTERAL; SOFT TISSUE at 09:39

## 2021-07-15 RX ADMIN — IOHEXOL 1 ML: 300 INJECTION, SOLUTION INTRAVENOUS at 09:36

## 2021-07-15 NOTE — DISCHARGE INSTR - LAB
Epidural Steroid Injection   WHAT YOU NEED TO KNOW:   An epidural steroid injection (JANICE) is a procedure to inject steroid medicine into the epidural space  The epidural space is between your spinal cord and vertebrae  Steroids reduce inflammation and fluid buildup in your spine that may be causing pain  You may be given pain medicine along with the steroids  ACTIVITY  · Do not drive or operate machinery today  · No strenuous activity today - bending, lifting, etc   · You may resume normal activites starting tomorrow - start slowly and as tolerated  · You may shower today, but no tub baths or hot tubs  · You may have numbness for several hours from the local anesthetic  Please use caution and common sense, especially with weight-bearing activities  CARE OF THE INJECTION SITE  · If you have soreness or pain, apply ice to the area today (20 minutes on/20 minutes off)  · Starting tomorrow, you may use warm, moist heat or ice if needed  · You may have an increase or change in your discomfort for 36-48 hours after your treatment  · Apply ice and continue with any pain medication you have been prescribed  · [facility] if you have any of the following: redness, drainage, swelling, headache, stiff neck or fever above 100°F     SPECIAL INSTRUCTIONS  · Our office will contact you in approximately 7 days for a progress report  MEDICATIONS  · Continue to take all routine medications  · Our office may have instructed you to hold some medications  As no general anesthesia was used in today's procedure, you should not experience any side effects related to anesthesia  If you have a problem specifically related to your procedure, please call our office at [phone]  Problems not related to your procedure should be directed to your primary care physician

## 2021-07-15 NOTE — H&P
History of Present Illness:  The patient is a 64 y o  female who presents with complaints of mid back pain secondary to thoracic disc herniations and is here today for thoracic epidural steroid injection    Patient Active Problem List   Diagnosis    Allergic rhinitis    Allergic rhinitis due to pollen    Arthralgia of hip, left    Pain in left foot    Basal cell carcinoma (BCC) of skin of trunk    Degenerative lumbar disc    Carpal tunnel syndrome of right wrist    Common migraine    Degenerative lumbar spinal stenosis    Dietary calcium deficiency    Diverticulosis    Facet degeneration of lumbar region    Fibromyalgia    Foraminal stenosis of cervical region    Frequent UTI    Functional urinary incontinence    Gait instability    Herniated thoracic disc without myelopathy    Increased frequency of urination    Ingrown nail    Irritable colon    Knee pain    Lactose intolerance    Left acute otitis media    Left elbow pain    Thoracic back pain    Metrorrhagia    Mixed hyperlipidemia    Multinodular goiter    Paresthesia of both hands    Situational anxiety    SUZETTE (stress urinary incontinence, female)    Titubation    Urinary tract infection    Anaphylactic syndrome    Involuntary movements    Cervical disc disorder with radiculopathy of mid-cervical region       Past Medical History:   Diagnosis Date    Abnormal Pap smear of cervix     Allergic rhinitis     Anaphylaxis     Arthritis     Back pain     due to herniated discs    Basal cell carcinoma (BCC) of skin of trunk 5/17/2019    Carpal tunnel syndrome     Female infertility     Fibromyalgia     GERD (gastroesophageal reflux disease)     Migraine     Multinodular goiter 11/22/2017    Seasonal allergies     Thyroid nodule     Varicella     Wears glasses     reading only        Past Surgical History:   Procedure Laterality Date    COLONOSCOPY      EGD      MAMMO STEREOTACTIC BREAST BIOPSY RIGHT (ALL INC) Right 04/09/2003    Benign    TUBAL LIGATION  1993    WISDOM TOOTH EXTRACTION           Current Outpatient Medications:     Acetaminophen (TYLENOL PO), Take by mouth as needed, Disp: , Rfl:     ALPRAZolam (XANAX) 0 5 mg tablet, 1/2 to 1tab 30mins before flight and may repeat dose if needed, Disp: , Rfl:     calcium carbonate (Tums) 500 mg chewable tablet, Chew 1 tablet daily, Disp: , Rfl:     etodolac (LODINE) 200 MG capsule, Take 200 mg by mouth every 8 (eight) hours, Disp: , Rfl:     Famotidine (PEPCID PO), Take by mouth 2 (two) times a day, Disp: , Rfl:     fexofenadine (ALLEGRA) 180 MG tablet, Take 180 mg by mouth daily, Disp: , Rfl:     ibuprofen (MOTRIN) 200 mg tablet, Take by mouth every 6 (six) hours as needed for mild pain (pt takes (2) as needed), Disp: , Rfl:     methocarbamol (ROBAXIN) 500 mg tablet, Take 500 mg by mouth as needed, Disp: , Rfl:     MULTIPLE VITAMIN PO, Take by mouth, Disp: , Rfl:   No current facility-administered medications for this encounter  Allergies   Allergen Reactions    Omeprazole Anaphylaxis    Pantoprazole Anaphylaxis     ER VISIT 2015    Lidocaine Rash     With myalgias    Dairy Aid [Lactase]     Levofloxacin Swelling     Throat swelling, GI intolerance, anxiety       Physical Exam:   Vitals:    07/15/21 0913   BP: 145/84   Pulse: 87   Resp: 20   Temp: 98 3 °F (36 8 °C)   SpO2: 97%     General: Awake, Alert, Oriented x 3, Mood and affect appropriate  Respiratory: Respirations even and unlabored  Cardiovascular: Peripheral pulses intact; no edema  Musculoskeletal Exam:  Midback pain    ASA Score: 2    Patient/Chart Verification  Patient ID Verified: Verbal  Consents Confirmed: To be obtained in the Pre-Procedure area  H&P( within 30 days) Verified: To be obtained in the Pre-Procedure area  Interval H&P(within 24 hr) Complete (required for Outpatients and Surgery Admit only): To be obtained in the Pre-Procedure area  Allergies Reviewed:  Yes  Anticoag/NSAID held?: Yes (lodine and ibuprofen held 6 days)  Currently on antibiotics?: No    Assessment:   1   Intervertebral disc disorder with radiculopathy of thoracic region        Plan: Thoracic JANICE

## 2021-07-22 ENCOUNTER — TELEPHONE (OUTPATIENT)
Dept: PAIN MEDICINE | Facility: CLINIC | Age: 56
End: 2021-07-22

## 2022-01-11 ENCOUNTER — TELEMEDICINE (OUTPATIENT)
Dept: NEUROLOGY | Facility: CLINIC | Age: 57
End: 2022-01-11
Payer: COMMERCIAL

## 2022-01-11 DIAGNOSIS — R25.9 INVOLUNTARY MOVEMENTS: Primary | ICD-10-CM

## 2022-01-11 DIAGNOSIS — R26.81 GAIT INSTABILITY: ICD-10-CM

## 2022-01-11 DIAGNOSIS — M51.14 INTERVERTEBRAL DISC DISORDER WITH RADICULOPATHY OF THORACIC REGION: ICD-10-CM

## 2022-01-11 DIAGNOSIS — M50.120 CERVICAL DISC DISORDER WITH RADICULOPATHY OF MID-CERVICAL REGION: ICD-10-CM

## 2022-01-11 PROCEDURE — 99213 OFFICE O/P EST LOW 20 MIN: CPT | Performed by: PSYCHIATRY & NEUROLOGY

## 2022-01-11 NOTE — PROGRESS NOTES
Virtual Regular Visit    Verification of patient location:    Patient is located in the following state in which I hold an active license [place]      Assessment/Plan:    Problem List Items Addressed This Visit        Nervous and Auditory    Cervical disc disorder with radiculopathy of mid-cervical region    Intervertebral disc disorder with radiculopathy of thoracic region       Other    Gait instability     Mild imbalance over the past few years  She has cervical and thoracic degeneration which may be contributing  MRI thoracic spine also demonstrated thyroid nodule  Also following with endocrinology for thyroid nodule with yearly ultrasound  Gait appeared steady on exam today She did not appear to be ataxic  MRI brain was normal without any cerebellar changes or structural deficits  She was encouraged to continue to perform activities with caution  She did report increase urinary leakage with prolonged walking  She has a history of leakage for which she previously saw urology and was given exercises  Can return to urology for options  She is welcome to return should she develop new neurological symptoms  Involuntary movements - Primary     Intermittent right eye twitching and occasional jerks  No involuntary movements noted on exam  Eye twitching most likely myokymia which is self limiting and can be exacerbated by stress or sleep deprivation  Reason for visit is follow up with  swaying and jerking   Chief Complaint   Patient presents with    Virtual Regular Visit        Encounter provider Ld Guerra MD    Provider located at [address] [place] [address] [address]  [facility] [id number] [phone]      Recent Visits  No visits were found meeting these conditions    Showing recent visits within past 7 days and meeting all other requirements  Today's Visits  Date Type Provider Dept   01/11/22 Telemedicine Ld Guerra MD [facility]   Showing today's visits and meeting all other requirements  Future Appointments  No visits were found meeting these conditions  Showing future appointments within next 150 days and meeting all other requirements       The patient was identified by name and date of birth  Froilan Dong was informed that this is a telemedicine visit and that the visit is being conducted through [facility] and patient was informed this is a secure, HIPAA-complaint platform  She agrees to proceed     My office door was closed  No one else was in the room  She acknowledged consent and understanding of privacy and security of the video platform  The patient has agreed to participate and understands they can discontinue the visit at any time  Patient is aware this is a billable service  Subjective  Froilan Dong is a 64 y o  female       Ms  David Cabrera is a woman with thyroid nodules, who presents for neurological follow up for swaying and involuntary movements of the neck and body  Movements began around 2019 with mild involuntary swaying of the trunk while seated or standing  Described as mild, variable but present all the time  Able to suppress only for short periods of time  Also with sporadic, brief head jerks to the left lasting seconds  No premonitory symptoms  She continues to have imbalance with a tendency to sway  She has notice that with prolonged walking she can have urinary leakage  Previously reports having seen urology and been given exercises  She follows with pain managements for back pain and had updated imaging of the cervical and thoracic spine reviewed below  She underwent JANICE over the summer  She will note right eye lid twitching with a sensation of right face being puffy at times  This is intermittent  She can have intermittent paresthesia of the right hand which she related to her cervical changes   She is here because she wishes to make sure there is no underlying reason for her symptoms  She has been able to manage with ambulating, biking and performing activities with caution  Prior work up:   Mri cervical spine 3/2021-Cervical degenerative disc disease most pronounced at the C5-6 level is similar to the prior examination with right greater than left foraminal narrowing and canal stenosis  No abnormal cord signal   MRI brain 8/18/20- normal     MRI thoracic- 3/2021- Scattered multilevel spondylosis most pronounced at T10-T11  No cord compression or cord signal abnormality  Heterogeneous bulbous appearing thyroid gland    Nonemergent thyroid ultrasound suggested for further assessment               Past Medical History:   Diagnosis Date    Abnormal Pap smear of cervix     Allergic rhinitis     Anaphylaxis     Arthritis     Back pain     due to herniated discs    Basal cell carcinoma (BCC) of skin of trunk 5/17/2019    Carpal tunnel syndrome     Female infertility     Fibromyalgia     GERD (gastroesophageal reflux disease)     Migraine     Multinodular goiter 11/22/2017    Seasonal allergies     Thyroid nodule     Varicella     Wears glasses     reading only        Past Surgical History:   Procedure Laterality Date    COLONOSCOPY      EGD      MAMMO STEREOTACTIC BREAST BIOPSY RIGHT (ALL INC) Right 04/09/2003    Benign    TUBAL LIGATION  1993    WISDOM TOOTH EXTRACTION         Current Outpatient Medications   Medication Sig Dispense Refill    Acetaminophen (TYLENOL PO) Take by mouth as needed      ALPRAZolam (XANAX) 0 5 mg tablet 1/2 to 1tab 30mins before flight and may repeat dose if needed      calcium carbonate (Tums) 500 mg chewable tablet Chew 1 tablet daily      etodolac (LODINE) 200 MG capsule Take 200 mg by mouth as needed        Famotidine (PEPCID PO) Take by mouth daily        fexofenadine (ALLEGRA) 180 MG tablet Take 180 mg by mouth daily      ibuprofen (MOTRIN) 200 mg tablet Take by mouth every 6 (six) hours as needed for mild pain (pt takes (2) as needed)      methocarbamol (ROBAXIN) 500 mg tablet Take 500 mg by mouth as needed      MULTIPLE VITAMIN PO Take by mouth       No current facility-administered medications for this visit  Allergies   Allergen Reactions    Omeprazole Anaphylaxis    Pantoprazole Anaphylaxis     ER VISIT 2015    Lidocaine Rash     With myalgias    Dairy Aid [Lactase]     Levofloxacin Swelling     Throat swelling, GI intolerance, anxiety       Review of Systems   Constitutional: Positive for fatigue (Tiredness)  Negative for appetite change and fever  HENT: Negative  Negative for hearing loss, tinnitus, trouble swallowing and voice change  Eyes: Negative for photophobia and pain  Twitch in Right Eye lid     Respiratory: Negative  Negative for shortness of breath  Cardiovascular: Negative  Negative for palpitations  Gastrointestinal: Negative  Negative for nausea and vomiting  Endocrine: Negative  Negative for cold intolerance  Genitourinary: Negative for dysuria, frequency and urgency  Trouble controlling bladder when walking but when riding a bike she has no issues   Musculoskeletal: Positive for back pain (Herniated discs), myalgias and neck pain  Negative for neck stiffness  Feels a little off balance when walking or moving too fast, tends to walk towards the left   Skin: Negative  Negative for rash  Allergic/Immunologic: Negative  Neurological: Positive for light-headedness (Sometimes has a little bit of light-headedness)  Negative for dizziness, tremors, seizures, syncope, facial asymmetry, speech difficulty, weakness, numbness and headaches  At times has Tingling on Right Side of Face  Jerking motions in head      Hematological: Negative  Does not bruise/bleed easily  Psychiatric/Behavioral: Negative  Negative for confusion, hallucinations and sleep disturbance  All other systems reviewed and are negative        Video Exam    There were no vitals filed for this visit  Physical Exam   Mental status: Patient is awake, alert and oriented x 4, Follows commands  Normal speech  Cranial Nerves:   CN III-VI- extra ocular muscles intact   CN VII- symmetric facial movements  CN VIII- normal to voice   CN IX- symmetric shoulder shrug   CN XII- midline tongue   Motor: no pronator drift, able to stand on each leg, sways when standing on right   Coordination: No tremor with hands outstretched  No intentional tremor on nose to extended arm bilaterally  Gait: Normal stride    I spent 10 minutes directly with the patient during this visit    VIRTUAL VISIT Juan Andrews Ledbetter 155 verbally agrees to participate in [employer]  Pt is aware that [employer] could be limited without vital signs or the ability to perform a full hands-on physical [employer] understands she or the provider may request at any time to terminate the video visit and request the patient to seek care or treatment in person

## 2022-01-11 NOTE — ASSESSMENT & PLAN NOTE
Mild imbalance over the past few years  She has cervical and thoracic degeneration which may be contributing  MRI thoracic spine also demonstrated thyroid nodule  Also following with endocrinology for thyroid nodule with yearly ultrasound  Gait appeared steady on exam today She did not appear to be ataxic  MRI brain was normal without any cerebellar changes or structural deficits  She was encouraged to continue to perform activities with caution  She did report increase urinary leakage with prolonged walking  She has a history of leakage for which she previously saw urology and was given exercises  Can return to urology for options  She is welcome to return should she develop new neurological symptoms

## 2022-01-11 NOTE — ASSESSMENT & PLAN NOTE
Intermittent right eye twitching and occasional jerks  No involuntary movements noted on exam  Eye twitching most likely myokymia which is self limiting and can be exacerbated by stress or sleep deprivation

## 2022-04-12 ENCOUNTER — OFFICE VISIT (OUTPATIENT)
Dept: PAIN MEDICINE | Facility: CLINIC | Age: 57
End: 2022-04-12
Payer: COMMERCIAL

## 2022-04-12 VITALS
HEIGHT: 63 IN | WEIGHT: 175.2 LBS | SYSTOLIC BLOOD PRESSURE: 122 MMHG | BODY MASS INDEX: 31.04 KG/M2 | DIASTOLIC BLOOD PRESSURE: 84 MMHG

## 2022-04-12 DIAGNOSIS — M50.220 HERNIATION OF INTERVERTEBRAL DISC OF MID-CERVICAL REGION, UNSPECIFIED SPINAL LEVEL: ICD-10-CM

## 2022-04-12 DIAGNOSIS — M79.18 MYOFASCIAL PAIN SYNDROME: ICD-10-CM

## 2022-04-12 DIAGNOSIS — G89.4 CHRONIC PAIN SYNDROME: Primary | ICD-10-CM

## 2022-04-12 PROCEDURE — 99214 OFFICE O/P EST MOD 30 MIN: CPT

## 2022-04-12 NOTE — PROGRESS NOTES
Assessment:  1  Chronic pain syndrome    2  Myofascial pain syndrome    3  Herniation of intervertebral disc of mid-cervical region, unspecified spinal level        Plan:  The patient is a 57-year-old female with a history of chronic pain secondary to intervertebral disc disorder with radiculopathy of the thoracic region and a cervical disc disorder with radiculopathy of midcervical region  She is presenting to the office with left-sided thoracic pain that appears to be myofascial in nature, therefore I will order trigger point injections to be done on the left-sided thoracic paraspinal   Instructed our  will call to schedule at her convenience  Complete risks and benefits including bleeding, infection, tissue reaction, nerve injury and allergic reaction were discussed  The approach was demonstrated using models and literature was provided  Verbal and written consent was obtained  I also instructed patient while she is having this acute flare up, she can increase her methocarbamol from 500 mg to 750 mg in hopes to provide her relief of her thoracic pain  She can also continue taking Motrin as needed  My impressions and treatment recommendations were discussed in detail with the patient who verbalized understanding and had no further questions  Discharge instructions were provided  I personally saw and examined the patient and I agree with the above discussed plan of care  Orders Placed This Encounter   Procedures    Injection trigger point 1 or 2 muscles     Standing Status:   Future     Standing Expiration Date:   4/12/2023     Scheduling Instructions:      Thoracic paraspinals left side  Dr Bryon Bhatia     No orders of the defined types were placed in this encounter        History of Present Illness:  Yin Da Silva is a 62 y o  female with a history of chronic pain secondary to intervertebral disc disorder with radiculopathy of the thoracic region and a cervical disc disorder with radiculopathy of midcervical region  She presents to the office after a 2 day increase in left-sided midback pain that started after bike riding  She states her pain is worse since her last office visit and constant  She describes the quality of her pain as sharp and cramping and is currently rating it a 6/10 on a numeric scale  She is currently taking methocarbamol 500 mg 1 tablet every 8 hours and Motrin as needed for her discomfort which is providing her 40% relief of her pain symptoms  I have personally reviewed and/or updated the patient's past medical history, past surgical history, family history, social history, current medications, allergies, and vital signs today  Review of Systems   Constitutional: Negative  HENT: Positive for congestion  Eyes: Negative  Respiratory: Negative  Cardiovascular: Negative  Gastrointestinal: Negative  Endocrine: Negative  Genitourinary: Negative  Musculoskeletal: Positive for back pain  Skin: Negative  Allergic/Immunologic: Negative  Neurological: Positive for light-headedness and numbness  Hematological: Negative  Psychiatric/Behavioral: Negative          Patient Active Problem List   Diagnosis    Allergic rhinitis    Allergic rhinitis due to pollen    Arthralgia of hip, left    Pain in left foot    Basal cell carcinoma (BCC) of skin of trunk    Degenerative lumbar disc    Carpal tunnel syndrome of right wrist    Common migraine    Degenerative lumbar spinal stenosis    Dietary calcium deficiency    Diverticulosis    Facet degeneration of lumbar region    Fibromyalgia    Foraminal stenosis of cervical region    Frequent UTI    Functional urinary incontinence    Gait instability    Herniated thoracic disc without myelopathy    Increased frequency of urination    Ingrown nail    Irritable colon    Knee pain    Lactose intolerance    Left acute otitis media    Left elbow pain    Thoracic back pain    Metrorrhagia    Mixed hyperlipidemia    Multinodular goiter    Paresthesia of both hands    Situational anxiety    SUZETTE (stress urinary incontinence, female)    Titubation    Urinary tract infection    Anaphylactic syndrome    Involuntary movements    Cervical disc disorder with radiculopathy of mid-cervical region    Intervertebral disc disorder with radiculopathy of thoracic region       Past Medical History:   Diagnosis Date    Abnormal Pap smear of cervix     Allergic rhinitis     Anaphylaxis     Arthritis     Back pain     due to herniated discs    Basal cell carcinoma (BCC) of skin of trunk 5/17/2019    Carpal tunnel syndrome     Female infertility     Fibromyalgia     GERD (gastroesophageal reflux disease)     Migraine     Multinodular goiter 11/22/2017    Seasonal allergies     Thyroid nodule     Varicella     Wears glasses     reading only        Past Surgical History:   Procedure Laterality Date    COLONOSCOPY      EGD      MAMMO STEREOTACTIC BREAST BIOPSY RIGHT (ALL INC) Right 04/09/2003    Benign    TUBAL LIGATION  1993    WISDOM TOOTH EXTRACTION         Family History   Problem Relation Age of Onset    Diabetes Mother     Hyperlipidemia Mother     Allergic rhinitis Mother     Parkinsonism Father     Diabetes Maternal Aunt     Hyperlipidemia Maternal Aunt     Allergic rhinitis Daughter     No Known Problems Daughter     Breast cancer Half-Sister 40    No Known Problems Brother     No Known Problems Sister     Breast cancer Sister     No Known Problems Brother     No Known Problems Brother     Thyroid disease unspecified Brother     Colon cancer Neg Hx     Uterine cancer Neg Hx     Ovarian cancer Neg Hx        Social History     Occupational History    Not on file   Tobacco Use    Smoking status: Never Smoker    Smokeless tobacco: Never Used   Vaping Use    Vaping Use: Never used   Substance and Sexual Activity    Alcohol use: No    Drug use: Never  Sexual activity: Yes     Partners: Male     Birth control/protection: None       Current Outpatient Medications on File Prior to Visit   Medication Sig    Acetaminophen (TYLENOL PO) Take by mouth as needed    ALPRAZolam (XANAX) 0 5 mg tablet 1/2 to 1tab 30mins before flight and may repeat dose if needed    calcium carbonate (Tums) 500 mg chewable tablet Chew 1 tablet daily    etodolac (LODINE) 200 MG capsule Take 200 mg by mouth as needed      Famotidine (PEPCID PO) Take by mouth daily      fexofenadine (ALLEGRA) 180 MG tablet Take 180 mg by mouth daily    ibuprofen (MOTRIN) 200 mg tablet Take by mouth every 6 (six) hours as needed for mild pain (pt takes (2) as needed)    methocarbamol (ROBAXIN) 500 mg tablet Take 500 mg by mouth as needed    MULTIPLE VITAMIN PO Take by mouth     No current facility-administered medications on file prior to visit  Allergies   Allergen Reactions    Omeprazole Anaphylaxis    Pantoprazole Anaphylaxis     ER VISIT 2015    Lidocaine Rash     With myalgias    Dairy Aid [Lactase]     Levofloxacin Swelling     Throat swelling, GI intolerance, anxiety       Physical Exam:    /84   Ht 5' 3" (1 6 m)   Wt 79 5 kg (175 lb 3 2 oz)   LMP  (LMP Unknown)   BMI 31 04 kg/m²     Constitutional:normal, well developed, well nourished, alert, in no distress and non-toxic and no overt pain behavior    Eyes:anicteric  HEENT:grossly intact  Neck:supple, symmetric, trachea midline and no masses   Pulmonary:even and unlabored  Cardiovascular:No edema or pitting edema present  Skin:Normal without rashes or lesions and well hydrated  Psychiatric:Mood and affect appropriate  Neurologic:Cranial Nerves II-XII grossly intact  Musculoskeletal:normal    Thoracic Spine Exam    Appearance:  Normal lordosis  Palpation/Tenderness:  left thoracic paraspinal tenderness  trigger points palpable  Sensory:  no sensory deficits noted  Range of Motion:  Flexion:  Minimally limited  with pain  Extension:  Minimally limited  with pain    Imaging  MRI CERVICAL SPINE WITHOUT CONTRAST (3/22/2021)     INDICATION: M50 120: Mid-cervical disc disorder, unspecified level      COMPARISON:  9/9/2015     TECHNIQUE:  Sagittal T1, sagittal T2, sagittal inversion recovery, axial T2, axial  2D merge     IMAGE QUALITY:  Diagnostic     FINDINGS:     ALIGNMENT:  Normal alignment of the cervical spine   No compression fracture   No subluxation   No scoliosis      MARROW SIGNAL:  Small hemangioma within the T2 vertebral body      CERVICAL AND VISUALIZED THORACIC CORD:  Normal signal within the visualized cord      PREVERTEBRAL AND PARASPINAL SOFT TISSUES: Slightly heterogeneous thyroid gland with small nodules identified bilaterally   The largest of these is seen within the lower pole of the left lobe of the gland measuring just over 1 cm in maximum dimension     Incidental discovery of one or more thyroid nodule(s) measuring less than 1 5 cm and without suspicious features is noted in this patient who is above 28years old; according to guidelines published in the February 2015 white paper on incidental thyroid   nodules in the Journal of the [employer] Dimas Godfrey), no further evaluation is recommended       VISUALIZED POSTERIOR FOSSA:  The visualized posterior fossa demonstrates no abnormal signal      CERVICAL DISC SPACES:     C2-C3:  Normal      C3-C4:  Normal      C4-C5:  Normal      C5-C6:  There is loss of disc height with mild annular bulging   There is a broad-based right paramedian and foraminal disc herniation with bilateral uncinate joint hypertrophic osteophyte formation, right greater than left   Overall there is mild canal   stenosis with minimal flattening of the right lateral aspect of the cord ventrally   Mild left and moderate right foraminal narrowing   Findings are similar to prior exam      C6-C7:  Tiny central disc herniation unchanged from the prior examination without canal stenosis or foraminal narrowing      C7-T1:  Normal      UPPER THORACIC DISC SPACES:  Normal      IMPRESSION:     Cervical degenerative disc disease most pronounced at the C5-6 level is similar to the prior examination with right greater than left foraminal narrowing and canal stenosis   No abnormal cord signal            MRI THORACIC SPINE WITHOUT CONTRAST (3/22/2021)     INDICATION: M51 14: Intervertebral disc disorders with radiculopathy, thoracic region      COMPARISON:  None      TECHNIQUE:  Sagittal T1, sagittal T2, sagittal inversion recovery, axial T2,  axial 2D MERGE      IMAGE QUALITY: Diagnostic      FINDINGS:     ALIGNMENT: Normal alignment of the thoracic spine   No compression fracture   No subluxation   No scoliosis      MARROW SIGNAL:  Scattered degenerative endplate changes   No focally suspicious marrow lesions   No bone marrow edema or compression abnormality   Hemangioma in the T2 vertebra noted there is also a hemangioma in the T8 vertebra      THORACIC CORD: Normal signal within the thoracic cord      PARAVERTEBRAL SOFT TISSUES: Projecting from the undersurface left side of the thyroid gland there is a possible 2 3 cm mass image 5, series 8   Thyroid gland is otherwise heterogeneous and bulbous in appearance   Incidental discovery of one or more   thyroid nodule(s) measuring more than 1 5 cm and without suspicious features is noted in this patient who is above 28years old; according to guidelines published in the February 2015 white paper on incidental thyroid nodules in the Journal of the   [employer] Jasmine Cabrera), further characterization with thyroid ultrasound is recommended      THORACIC DEGENERATIVE CHANGE: T8-T9: Small central disc herniation, protrusion type   No significant central canal or neural foraminal narrowing      T9-T10:  There is a small central disc herniation, protrusion type   Mild to moderate central canal narrowing   Neural foramina patent bilaterally      T10-T11: There is a central disc herniation, protrusion type   There is moderate central canal narrowing   Mild bilateral neural foraminal narrowing      T11-T12: There is mild loss of disc height and signal  Elver Felty is a small central disc protrusion   Neural foramina patent bilaterally      IMPRESSION:     1    Scattered multilevel spondylosis most pronounced at T10-T11   No cord compression or cord signal abnormality  2   Heterogeneous bulbous appearing thyroid gland   Nonemergent thyroid ultrasound suggested for further assessment      Incidental thyroid nodule(s) for which nonemergent thyroid ultrasound is recommended

## 2022-05-18 ENCOUNTER — OFFICE VISIT (OUTPATIENT)
Dept: PAIN MEDICINE | Facility: CLINIC | Age: 57
End: 2022-05-18
Payer: COMMERCIAL

## 2022-05-18 VITALS
DIASTOLIC BLOOD PRESSURE: 72 MMHG | WEIGHT: 175 LBS | SYSTOLIC BLOOD PRESSURE: 118 MMHG | HEART RATE: 78 BPM | BODY MASS INDEX: 31 KG/M2

## 2022-05-18 DIAGNOSIS — M51.14 INTERVERTEBRAL DISC DISORDER WITH RADICULOPATHY OF THORACIC REGION: Primary | ICD-10-CM

## 2022-05-18 DIAGNOSIS — M50.120 CERVICAL DISC DISORDER WITH RADICULOPATHY OF MID-CERVICAL REGION: ICD-10-CM

## 2022-05-18 PROCEDURE — 99214 OFFICE O/P EST MOD 30 MIN: CPT | Performed by: ANESTHESIOLOGY

## 2022-05-18 NOTE — PROGRESS NOTES
Assessment:  1  Intervertebral disc disorder with radiculopathy of thoracic region    2  Cervical disc disorder with radiculopathy of mid-cervical region        Plan:  The patient is having recurrence of pain in her thoracic spine related to her thoracic disc herniations which is causing radicular symptoms  At this time, I discussed repeating the thoracic epidural steroid injection which provided her significant relief  She would like to proceed and will be scheduled in 2 weeks under fluoroscopic guidance  Complete risks and benefits including bleeding, infection, tissue reaction, nerve injury and allergic reaction were discussed  The approach was demonstrated using models and literature was provided  Verbal and written consent was obtained  My impressions and treatment recommendations were discussed in detail with the patient who verbalized understanding and had no further questions  Discharge instructions were provided  I personally saw and examined the patient and I agree with the above discussed plan of care  Orders Placed This Encounter   Procedures    FL spine and pain procedure     Standing Status:   Future     Standing Expiration Date:   5/18/2026     Order Specific Question:   Reason for Exam:     Answer:   Thoracic JANICE     Order Specific Question:   Is the patient pregnant? Answer:   No     Order Specific Question:   Anticoagulant hold needed? Answer:   No     No orders of the defined types were placed in this encounter  History of Present Illness:  Vladimir Akins is a 62 y o  female who presents for a follow up office visit in regards to Back Pain  The patient has a history of cervical and thoracic disc herniations and returns for follow-up  She was last seen about a year ago which time she underwent a thoracic epidural steroid injection as well as a cervical epidural steroid injection which were both helpful    She reports recurrence of pain primarily in the midback in the thoracic region that radiates into both sides that is moderate to severe  I have personally reviewed and/or updated the patient's past medical history, past surgical history, family history, social history, current medications, allergies, and vital signs today  Review of Systems   Respiratory: Negative for shortness of breath  Cardiovascular: Negative for chest pain  Gastrointestinal: Negative for constipation, diarrhea, nausea and vomiting  Musculoskeletal: Positive for back pain, gait problem and joint swelling  Negative for arthralgias and myalgias  Skin: Negative for rash  Neurological: Positive for weakness and numbness  Negative for dizziness and seizures  All other systems reviewed and are negative        Patient Active Problem List   Diagnosis    Allergic rhinitis    Allergic rhinitis due to pollen    Arthralgia of hip, left    Pain in left foot    Basal cell carcinoma (BCC) of skin of trunk    Degenerative lumbar disc    Carpal tunnel syndrome of right wrist    Common migraine    Degenerative lumbar spinal stenosis    Dietary calcium deficiency    Diverticulosis    Facet degeneration of lumbar region    Fibromyalgia    Foraminal stenosis of cervical region    Frequent UTI    Functional urinary incontinence    Gait instability    Herniated thoracic disc without myelopathy    Increased frequency of urination    Ingrown nail    Irritable colon    Knee pain    Lactose intolerance    Left acute otitis media    Left elbow pain    Thoracic back pain    Metrorrhagia    Mixed hyperlipidemia    Multinodular goiter    Paresthesia of both hands    Situational anxiety    SUZETTE (stress urinary incontinence, female)    Titubation    Urinary tract infection    Anaphylactic syndrome    Involuntary movements    Cervical disc disorder with radiculopathy of mid-cervical region    Intervertebral disc disorder with radiculopathy of thoracic region       Past Medical History:   Diagnosis Date    Abnormal Pap smear of cervix     Allergic rhinitis     Anaphylaxis     Arthritis     Back pain     due to herniated discs    Basal cell carcinoma (BCC) of skin of trunk 5/17/2019    Carpal tunnel syndrome     Female infertility     Fibromyalgia     GERD (gastroesophageal reflux disease)     Migraine     Multinodular goiter 11/22/2017    Seasonal allergies     Thyroid nodule     Varicella     Wears glasses     reading only        Past Surgical History:   Procedure Laterality Date    COLONOSCOPY      EGD      MAMMO STEREOTACTIC BREAST BIOPSY RIGHT (ALL INC) Right 04/09/2003    Benign    TUBAL LIGATION  1993    WISDOM TOOTH EXTRACTION         Family History   Problem Relation Age of Onset    Diabetes Mother     Hyperlipidemia Mother     Allergic rhinitis Mother     Parkinsonism Father     Diabetes Maternal Aunt     Hyperlipidemia Maternal Aunt     Allergic rhinitis Daughter     No Known Problems Daughter     Breast cancer Half-Sister 40    No Known Problems Brother     No Known Problems Sister     Breast cancer Sister     No Known Problems Brother     No Known Problems Brother     Thyroid disease unspecified Brother     Colon cancer Neg Hx     Uterine cancer Neg Hx     Ovarian cancer Neg Hx        Social History     Occupational History    Not on file   Tobacco Use    Smoking status: Never Smoker    Smokeless tobacco: Never Used   Vaping Use    Vaping Use: Never used   Substance and Sexual Activity    Alcohol use: No    Drug use: Never    Sexual activity: Yes     Partners: Male     Birth control/protection: None       Current Outpatient Medications on File Prior to Visit   Medication Sig    Acetaminophen (TYLENOL PO) Take by mouth as needed    ALPRAZolam (XANAX) 0 5 mg tablet 1/2 to 1tab 30mins before flight and may repeat dose if needed    calcium carbonate (Tums) 500 mg chewable tablet Chew 1 tablet daily    etodolac (LODINE) 200 MG capsule Take 200 mg by mouth as needed      Famotidine (PEPCID PO) Take by mouth daily      fexofenadine (ALLEGRA) 180 MG tablet Take 180 mg by mouth daily    ibuprofen (MOTRIN) 200 mg tablet Take by mouth every 6 (six) hours as needed for mild pain (pt takes (2) as needed)    MULTIPLE VITAMIN PO Take by mouth    [DISCONTINUED] methocarbamol (ROBAXIN) 500 mg tablet Take 500 mg by mouth as needed     No current facility-administered medications on file prior to visit  Allergies   Allergen Reactions    Omeprazole Anaphylaxis    Pantoprazole Anaphylaxis     ER VISIT 2015    Lidocaine Rash     With myalgias    Dairy Aid [Lactase]     Levofloxacin Swelling     Throat swelling, GI intolerance, anxiety       Physical Exam:    /72   Pulse 78   Wt 79 4 kg (175 lb)   LMP  (LMP Unknown)   BMI 31 00 kg/m²     Constitutional:normal, well developed, well nourished, alert, in no distress and non-toxic and no overt pain behavior  Eyes:anicteric  HEENT:grossly intact  Neck:supple, symmetric, trachea midline and no masses   Pulmonary:even and unlabored  Cardiovascular:No edema or pitting edema present  Skin:Normal without rashes or lesions and well hydrated  Psychiatric:Mood and affect appropriate  Neurologic:Cranial Nerves II-XII grossly intact  Musculoskeletal:Examination of thoracic spine reveals tenderness to palpation in the midback bra line with spasms aggravated with twisting motions    Imaging    MRI THORACIC SPINE WITHOUT CONTRAST (3/20/2021)     INDICATION: M51 14: Intervertebral disc disorders with radiculopathy, thoracic region      COMPARISON:  None      TECHNIQUE:  Sagittal T1, sagittal T2, sagittal inversion recovery, axial T2,  axial 2D MERGE      IMAGE QUALITY: Diagnostic      FINDINGS:     ALIGNMENT: Normal alignment of the thoracic spine  No compression fracture  No subluxation  No scoliosis      MARROW SIGNAL:  Scattered degenerative endplate changes    No focally suspicious marrow lesions  No bone marrow edema or compression abnormality  Hemangioma in the T2 vertebra noted there is also a hemangioma in the T8 vertebra      THORACIC CORD: Normal signal within the thoracic cord      PARAVERTEBRAL SOFT TISSUES: Projecting from the undersurface left side of the thyroid gland there is a possible 2 3 cm mass image 5, series 8  Thyroid gland is otherwise heterogeneous and bulbous in appearance  Incidental discovery of one or more   thyroid nodule(s) measuring more than 1 5 cm and without suspicious features is noted in this patient who is above 28years old; according to guidelines published in the February 2015 white paper on incidental thyroid nodules in the Journal of the   [employer] Silver Jest), further characterization with thyroid ultrasound is recommended      THORACIC DEGENERATIVE CHANGE: T8-T9: Small central disc herniation, protrusion type  No significant central canal or neural foraminal narrowing      T9-T10: There is a small central disc herniation, protrusion type  Mild to moderate central canal narrowing  Neural foramina patent bilaterally      T10-T11: There is a central disc herniation, protrusion type  There is moderate central canal narrowing  Mild bilateral neural foraminal narrowing      T11-T12: There is mild loss of disc height and signal   There is a small central disc protrusion    Neural foramina patent bilater

## 2022-05-23 ENCOUNTER — TELEPHONE (OUTPATIENT)
Dept: PAIN MEDICINE | Facility: CLINIC | Age: 57
End: 2022-05-23

## 2022-05-25 NOTE — TELEPHONE ENCOUNTER
Pt rescheduled for 6/21/22 TESI   Last dose of motrin 6/19/22 denies rx blood thinners  Went over pre-procedure instructions below:  Nothing to eat or drink 1 hr prior to procedure  Need to arrange transportation  Proper clothing for procedure  If ill or placed on antibiotics please call to reschedule  Vaccine instructions

## 2022-06-20 ENCOUNTER — OFFICE VISIT (OUTPATIENT)
Dept: NEUROSURGERY | Facility: CLINIC | Age: 57
End: 2022-06-20
Payer: COMMERCIAL

## 2022-06-20 VITALS
SYSTOLIC BLOOD PRESSURE: 123 MMHG | RESPIRATION RATE: 18 BRPM | DIASTOLIC BLOOD PRESSURE: 82 MMHG | HEART RATE: 80 BPM | BODY MASS INDEX: 31.01 KG/M2 | WEIGHT: 175 LBS | HEIGHT: 63 IN | TEMPERATURE: 98.2 F

## 2022-06-20 DIAGNOSIS — M47.814 THORACIC SPONDYLOSIS: Primary | ICD-10-CM

## 2022-06-20 PROCEDURE — 99203 OFFICE O/P NEW LOW 30 MIN: CPT | Performed by: PHYSICIAN ASSISTANT

## 2022-06-20 NOTE — TELEPHONE ENCOUNTER
RN s/w FQ prior to calling pt back, FQ is ok if pt had etodolac yest he would still proceed with inj tomorrow  RN s/w pt and she last took the Etodolac yest morning  Told pt not to take any Etodolac today and per FQ he is fine with proceeding with inj tomorrow

## 2022-06-20 NOTE — TELEPHONE ENCOUNTER
Pt called in she is currently on  Ectodolac and has a procedure on 06/21/2022 and wanted to know if that would be ok  Please be advised thank you    Pt can be reached @ [phone]

## 2022-06-20 NOTE — PROGRESS NOTES
Neurosurgery Office Note  Chris Perkins 62 y o  female MRN: [id number]      Assessment/Plan      Patient is a 62years old presents woman self-referred for evaluation of progressive posterior lower thoracic back pain  Patient reported history of chronic  right side posterolateral neck  Pain since over a year ago  The pain is localized, estimated the severity at 5/10, aggravated with certain position  She tried JANICE both in the neck and lower thorax, but Dr Alba Seals, a year ago without much improvement  She claims the symptom get worse after she moving furniture  No radicular pain, numbness, or paresthesia  No fine motor dysfunction or dropping objects  Denies gait issues or B/B dysfunction  Exam-A&OX3, communicates well  Strength 5/5 bilaterally including   Sensation to LT intact throughout  DTR 2+ without clonus bilaterally  Tandem gait instability noted  Slight tenderness in the posterior Lower thoracic spine on palpation paraspinal      Hx, PEx, and images reviewed with the patient  Mx plan discussed  Images from a year ago demonstrates mild to moderate disk at C5-6 and central disk at T10-T11 which appears non surgical  Patient with Hx of FM and her neck and back pain could be multifactorial   I would recommend patient to follow up with Dr Alba Seals for repeat [facility] or RFA, muscle relaxer  Advised PT-referral placed  Yoga, aqua-therapy/swimming, walking exercise  Fall precaution  Avoid lifting heavy objects, excessive bending or twisting  Patient expressed her understandings and agreed with the plan  Questions and concerns were answered  Plan:  1  Ambulatory  referral to pain management  2  Ambulatory referral to PT  3  Follow up on PRN-Advised to call office with worsening Syx to consider repeat images  4  Call with questions or concerns      I spent 30 minutes with the patient today in which >50% of the time was spent counseling/coordination of care regarding diagnosis, imaging review, symptoms and treatment plan  C/C: "  Neck and Posterior lower thoracic back pain"/ progressive over a year    History of Present Illness     62y o  year old female  With PMHx of Anxiety, FM, Anaphylactic syndrome,  Arthritis, self referred for evaluation of   progressive posterior lower thoracic back pain and also neck pain  Patient reported history of chronic  right side posterolateral neck  Pain since over a year ago  The pain is localized, estimated the severity at 5/10, aggravated with certain position  She tried JANICE both in the neck and lower thorax, but Dr Franchesca Verdugo, a year ago without much improvement  She claims the symptom get worse after she moving furniture  No radicular pain, numbness, or paresthesia  No fine motor dysfunction or dropping objects  Denies gait issues or B/B dysfunction  Patient denies any recent injection or PT  She takes Ibuprofen and Acetaminophen PRN  She denies fever, chills, rigors, cough or chest pain  Denies Hx of DM, HTN, CHF, Stroke, seizures or bleeding disorder or taking anticoagulant meds  Denies Hx of smoking cigarettes, or drinking alcohol  Images findings as described in the assessment section above  REVIEW OF SYSTEMS   ROS personally reviewed and upated  Review of Systems   Constitutional: Negative  HENT: Negative  Eyes: Negative  Respiratory: Negative  Cardiovascular: Negative  Gastrointestinal: Negative  Endocrine: Negative  Genitourinary: Negative  Urinary incontinence     Musculoskeletal: Positive for back pain (Thoracic pain radiates up to her neck  and b/ l shoulder), gait problem (unbalanced when walking), neck pain (Intermittent Neck pain non radiating ,  Pain 5/10) and neck stiffness (right sided, Decrease ROM when turning her head to the right and putting her head forward)  Walking (30 min)/ standing (30 min)increases her pain       PT 3 yrs ago/ Pain Man--injection x 2 on 4/2022, mild relief   NONE     Meds: LODINE, MOTRIN, METHOCARBAMOL   provide mild relief, but it's only  PRN   Skin: Negative  Allergic/Immunologic: Positive for environmental allergies  On Allegra     Neurological: Positive for light-headedness  Negative for headaches  Hematological: Negative  Psychiatric/Behavioral: Negative  All other systems reviewed and are negative  Meds/Allergies     Current Outpatient Medications   Medication Sig Dispense Refill    Acetaminophen (TYLENOL PO) Take by mouth as needed      ALPRAZolam (XANAX) 0 5 mg tablet 1/2 to 1tab 30mins before flight and may repeat dose if needed      calcium carbonate (Tums) 500 mg chewable tablet Chew 1 tablet daily      etodolac (LODINE) 200 MG capsule Take 200 mg by mouth as needed        Famotidine (PEPCID PO) Take by mouth daily        fexofenadine (ALLEGRA) 180 MG tablet Take 180 mg by mouth daily      ibuprofen (MOTRIN) 200 mg tablet Take by mouth every 6 (six) hours as needed for mild pain (pt takes (2) as needed)      MULTIPLE VITAMIN PO Take by mouth       No current facility-administered medications for this visit         Allergies   Allergen Reactions    Omeprazole Anaphylaxis    Pantoprazole Anaphylaxis     ER VISIT 2015    Lidocaine Rash     With myalgias    Dairy Aid [Lactase]     Levofloxacin Swelling     Throat swelling, GI intolerance, anxiety       PAST HISTORY    Past Medical History:   Diagnosis Date    Abnormal Pap smear of cervix     Allergic rhinitis     Anaphylaxis     Arthritis     Back pain     due to herniated discs    Basal cell carcinoma (BCC) of skin of trunk 5/17/2019    Carpal tunnel syndrome     Female infertility     Fibromyalgia     GERD (gastroesophageal reflux disease)     Migraine     Multinodular goiter 11/22/2017    Seasonal allergies     Thyroid nodule     Varicella     Wears glasses     reading only        Past Surgical History:   Procedure Laterality Date    COLONOSCOPY      EGD      MAMMO STEREOTACTIC BREAST BIOPSY RIGHT (ALL INC) Right 04/09/2003    Benign    TUBAL LIGATION  1993    WISDOM TOOTH EXTRACTION         Social History     Tobacco Use    Smoking status: Never Smoker    Smokeless tobacco: Never Used   Vaping Use    Vaping Use: Never used   Substance Use Topics    Alcohol use: No    Drug use: Never       Family History   Problem Relation Age of Onset    Diabetes Mother     Hyperlipidemia Mother     Allergic rhinitis Mother     Parkinsonism Father     Diabetes Maternal Aunt     Hyperlipidemia Maternal Aunt     Allergic rhinitis Daughter     No Known Problems Daughter     Breast cancer Half-Sister 40    No Known Problems Brother     No Known Problems Sister     Breast cancer Sister     No Known Problems Brother     No Known Problems Brother     Thyroid disease unspecified Brother     Colon cancer Neg Hx     Uterine cancer Neg Hx     Ovarian cancer Neg Hx          Above history personally reviewed  EXAM    Vitals: There were no vitals taken for this visit  ,There is no height or weight on file to calculate BMI  Physical Exam  Constitutional:       Appearance: Normal appearance  HENT:      Head: Normocephalic and atraumatic  Eyes:      Extraocular Movements: Extraocular movements intact  Cardiovascular:      Rate and Rhythm: Normal rate and regular rhythm  Pulses: Normal pulses  Heart sounds: Normal heart sounds  Pulmonary:      Effort: Respiratory distress present  Musculoskeletal:         General: Normal range of motion  Cervical back: Tenderness present  Neurological:      General: No focal deficit present  Mental Status: She is alert and oriented to person, place, and time  GCS: GCS eye subscore is 4  GCS verbal subscore is 5  GCS motor subscore is 6  Cranial Nerves: Cranial nerves are intact  Sensory: Sensation is intact  Motor: Motor function is intact        Coordination: Finger-Nose-Finger Test normal       Deep Tendon Reflexes: Reflexes are normal and symmetric  Reflex Scores:       Tricep reflexes are 2+ on the right side and 2+ on the left side  Bicep reflexes are 2+ on the right side and 2+ on the left side  Brachioradialis reflexes are 2+ on the right side and 2+ on the left side  Patellar reflexes are 2+ on the right side and 2+ on the left side  Achilles reflexes are 2+ on the right side and 2+ on the left side  Psychiatric:         Speech: Speech normal          Neurologic Exam     Mental Status   Oriented to person, place, and time  Speech: speech is normal   Level of consciousness: alert    Cranial Nerves     CN III, IV, VI   Nystagmus: none     CN XI   CN XI normal      Motor Exam   Muscle bulk: normal  Overall muscle tone: normal  Right arm tone: normal  Left arm tone: normal  Right arm pronator drift: absent  Left arm pronator drift: absent  Right leg tone: normal  Left leg tone: normal    Sensory Exam   Light touch normal      Gait, Coordination, and Reflexes     Coordination   Finger to nose coordination: normal    Reflexes   Right brachioradialis: 2+  Left brachioradialis: 2+  Right biceps: 2+  Left biceps: 2+  Right triceps: 2+  Left triceps: 2+  Right patellar: 2+  Left patellar: 2+  Right achilles: 2+  Left achilles: 2+  Right : 2+  Left : 2+  Right plantar: normal  Left plantar: normal  Right Shen: absent  Left Shen: absent  Right ankle clonus: absent  Left pendular knee jerk: absent        MEDICAL DECISION MAKING    Imaging Studies:     No results found      I have personally reviewed pertinent reports   , I have personally reviewed pertinent films in PACS and I have personally reviewed pertinent films in PACS with a Radiologist

## 2022-06-21 ENCOUNTER — HOSPITAL ENCOUNTER (OUTPATIENT)
Dept: RADIOLOGY | Facility: CLINIC | Age: 57
Discharge: HOME | End: 2022-06-21
Attending: ANESTHESIOLOGY | Admitting: ANESTHESIOLOGY
Payer: COMMERCIAL

## 2022-06-21 VITALS
TEMPERATURE: 98.3 F | SYSTOLIC BLOOD PRESSURE: 123 MMHG | RESPIRATION RATE: 20 BRPM | DIASTOLIC BLOOD PRESSURE: 86 MMHG | HEART RATE: 86 BPM | OXYGEN SATURATION: 96 %

## 2022-06-21 DIAGNOSIS — M51.14 INTERVERTEBRAL DISC DISORDER WITH RADICULOPATHY OF THORACIC REGION: ICD-10-CM

## 2022-06-21 PROCEDURE — 62321 NJX INTERLAMINAR CRV/THRC: CPT | Performed by: ANESTHESIOLOGY

## 2022-06-21 RX ORDER — METHYLPREDNISOLONE ACETATE 80 MG/ML
80 INJECTION, SUSPENSION INTRA-ARTICULAR; INTRALESIONAL; INTRAMUSCULAR; PARENTERAL; SOFT TISSUE ONCE
Status: COMPLETED | OUTPATIENT
Start: 2022-06-21 | End: 2022-06-21

## 2022-06-21 RX ADMIN — IOHEXOL 1 ML: 300 INJECTION, SOLUTION INTRAVENOUS at 09:14

## 2022-06-21 RX ADMIN — METHYLPREDNISOLONE ACETATE 80 MG: 80 INJECTION, SUSPENSION INTRA-ARTICULAR; INTRALESIONAL; INTRAMUSCULAR; PARENTERAL; SOFT TISSUE at 09:14

## 2022-06-21 NOTE — H&P
History of Present Illness:  The patient is a 62 y o  female who presents with complaints of mid back pain is irritated for thoracic epidural steroid injection    Patient Active Problem List   Diagnosis    Allergic rhinitis    Allergic rhinitis due to pollen    Arthralgia of hip, left    Pain in left foot    Basal cell carcinoma (BCC) of skin of trunk    Degenerative lumbar disc    Carpal tunnel syndrome of right wrist    Common migraine    Degenerative lumbar spinal stenosis    Dietary calcium deficiency    Diverticulosis    Facet degeneration of lumbar region    Fibromyalgia    Foraminal stenosis of cervical region    Frequent UTI    Functional urinary incontinence    Gait instability    Herniated thoracic disc without myelopathy    Increased frequency of urination    Ingrown nail    Irritable colon    Knee pain    Lactose intolerance    Left acute otitis media    Left elbow pain    Thoracic back pain    Metrorrhagia    Mixed hyperlipidemia    Multinodular goiter    Paresthesia of both hands    Situational anxiety    SUZETTE (stress urinary incontinence, female)    Titubation    Urinary tract infection    Anaphylactic syndrome    Involuntary movements    Cervical disc disorder with radiculopathy of mid-cervical region    Intervertebral disc disorder with radiculopathy of thoracic region       Past Medical History:   Diagnosis Date    Abnormal Pap smear of cervix     Allergic rhinitis     Anaphylaxis     Arthritis     Back pain     due to herniated discs    Basal cell carcinoma (BCC) of skin of trunk 5/17/2019    Carpal tunnel syndrome     Female infertility     Fibromyalgia     GERD (gastroesophageal reflux disease)     Migraine     Multinodular goiter 11/22/2017    Seasonal allergies     Thyroid nodule     Varicella     Wears glasses     reading only        Past Surgical History:   Procedure Laterality Date    COLONOSCOPY      EGD      MAMMO STEREOTACTIC BREAST BIOPSY RIGHT (ALL INC) Right 04/09/2003    Benign    TUBAL LIGATION  1993    WISDOM TOOTH EXTRACTION           Current Outpatient Medications:     Acetaminophen (TYLENOL PO), Take by mouth as needed, Disp: , Rfl:     ALPRAZolam (XANAX) 0 5 mg tablet, 1/2 to 1tab 30mins before flight and may repeat dose if needed, Disp: , Rfl:     calcium carbonate (Tums) 500 mg chewable tablet, Chew 1 tablet daily, Disp: , Rfl:     etodolac (LODINE) 200 MG capsule, Take 200 mg by mouth as needed  , Disp: , Rfl:     Famotidine (PEPCID PO), Take by mouth daily  , Disp: , Rfl:     fexofenadine (ALLEGRA) 180 MG tablet, Take 180 mg by mouth daily, Disp: , Rfl:     ibuprofen (MOTRIN) 200 mg tablet, Take by mouth every 6 (six) hours as needed for mild pain (pt takes (2) as needed), Disp: , Rfl:     MULTIPLE VITAMIN PO, Take by mouth, Disp: , Rfl:   No current facility-administered medications for this encounter  Allergies   Allergen Reactions    Omeprazole Anaphylaxis    Pantoprazole Anaphylaxis     ER VISIT 2015    Lidocaine Rash     With myalgias    Dairy Aid [Lactase]     Levofloxacin Swelling     Throat swelling, GI intolerance, anxiety       Physical Exam:   Vitals:    06/21/22 0858   BP: 116/80   Pulse: 85   Resp: 20   Temp: 98 3 °F (36 8 °C)   SpO2: 96%     General: Awake, Alert, Oriented x 3, Mood and affect appropriate  Respiratory: Respirations even and unlabored  Cardiovascular: Peripheral pulses intact; no edema  Musculoskeletal Exam:  Mid back pain     ASA Score: 2    Patient/Chart Verification  Patient ID Verified: Verbal  Consents Confirmed: To be obtained in the Pre-Procedure area  Interval H&P(within 24 hr) Complete (required for Outpatients and Surgery Admit only): To be obtained in the Pre-Procedure area  Allergies Reviewed: Yes  Anticoag/NSAID held?: NA  Currently on antibiotics?: No    Assessment:   1   Intervertebral disc disorder with radiculopathy of thoracic region        Plan: Thoracic JANICE

## 2022-06-21 NOTE — DISCHARGE INSTR - LAB
Epidural Steroid Injection   WHAT YOU NEED TO KNOW:   An epidural steroid injection (JANICE) is a procedure to inject steroid medicine into the epidural space  The epidural space is between your spinal cord and vertebrae  Steroids reduce inflammation and fluid buildup in your spine that may be causing pain  You may be given pain medicine along with the steroids  ACTIVITY  Do not drive or operate machinery today  No strenuous activity today - bending, lifting, etc   You may resume normal activites starting tomorrow - start slowly and as tolerated  You may shower today, but no tub baths or hot tubs  You may have numbness for several hours from the local anesthetic  Please use caution and common sense, especially with weight-bearing activities  CARE OF THE INJECTION SITE  If you have soreness or pain, apply ice to the area today (20 minutes on/20 minutes off)  Starting tomorrow, you may use warm, moist heat or ice if needed  You may have an increase or change in your discomfort for 36-48 hours after your treatment  Apply ice and continue with any pain medication you have been prescribed  Notify the [facility] if you have any of the following: redness, drainage, swelling, headache, stiff neck or fever above 100°F     SPECIAL INSTRUCTIONS  Our office will contact you in approximately 7 days for a progress report  MEDICATIONS  Continue to take all routine medications  Our office may have instructed you to hold some medications  As no general anesthesia was used in today's procedure, you should not experience any side effects related to anesthesia  If you have a problem specifically related to your procedure, please call our office at [phone]  Problems not related to your procedure should be directed to your primary care physician

## 2022-06-28 ENCOUNTER — TELEPHONE (OUTPATIENT)
Dept: PAIN MEDICINE | Facility: CLINIC | Age: 57
End: 2022-06-28

## 2022-06-30 NOTE — TELEPHONE ENCOUNTER
Patient states that she is feeling good  Patient states that she has about 70% relief  Patient states that her upper back is feeling better  Patient states that the neck is still hurting  Pain scale is 4/10

## 2022-07-29 ENCOUNTER — TELEPHONE (OUTPATIENT)
Dept: RADIOLOGY | Facility: CLINIC | Age: 57
End: 2022-07-29

## 2022-07-29 DIAGNOSIS — M51.14 INTERVERTEBRAL DISC DISORDER WITH RADICULOPATHY OF THORACIC REGION: ICD-10-CM

## 2022-07-29 DIAGNOSIS — M79.18 MYOFASCIAL PAIN SYNDROME: Primary | ICD-10-CM

## 2022-07-29 RX ORDER — ETODOLAC 400 MG/1
TABLET, FILM COATED ORAL
COMMUNITY
Start: 2022-06-22

## 2022-07-29 RX ORDER — METHYLPREDNISOLONE 4 MG/1
TABLET ORAL
Qty: 21 TABLET | Refills: 0 | Status: SHIPPED | OUTPATIENT
Start: 2022-07-29

## 2022-07-29 RX ORDER — CYCLOBENZAPRINE HCL 5 MG
5 TABLET ORAL 3 TIMES DAILY PRN
COMMUNITY
End: 2022-07-29 | Stop reason: SDUPTHER

## 2022-07-29 RX ORDER — CYCLOBENZAPRINE HCL 10 MG
10 TABLET ORAL 3 TIMES DAILY PRN
Qty: 90 TABLET | Refills: 5 | Status: SHIPPED | OUTPATIENT
Start: 2022-07-29

## 2022-07-29 NOTE — TELEPHONE ENCOUNTER
We can have her increase the cyclobenzaprine 10 mg 3 times a day as needed for better efficacy if she would like

## 2022-07-29 NOTE — TELEPHONE ENCOUNTER
----- Message from Pavel Gudino sent at 7/29/2022  9:15 AM EDT -----  Contact: [phone]  Good morning,    Patient called  line stating she is having a lot of pain in her shoulder blade area  I asked her if she wanted a repeat injection and she said no, she does not think they work well for her  I told her I would have the clinical staff give her a call to discuss      Thanks,  Wagner Canchola

## 2022-07-29 NOTE — TELEPHONE ENCOUNTER
Pt said yesterday she started with muscle spasms, tightness and burning of the lower Rt shoulder blade  It hurts her when she moves  She said ice increases the spasms but heat helps a little  She doesn't seem interested in having another TESI b/c she doesn't feel they help much  She said the inj in June only helped for a week  She said her PCP office switched her to cyclobenzaprine a while back after the methocarbamol wasn't helping  She took a dose this morning at 5 am, she feels the cyclobenzaprine works better  She has also taken some Tylenol  Told pt I would reach to  for further rec  Last TESI were 7/15/2021 and 6/21/2022  Last ov 5/18/2022

## 2022-07-29 NOTE — TELEPHONE ENCOUNTER
Pt willing to try the increased cyclobenzaprine 10 mg, she uses [employer] on file  Told pt if 10 mg during the day makes her too drowsy then drop to 0 5 tab during the daytime and whole tab at HS  Pt asked if oral steroid would also help?

## 2022-08-15 ENCOUNTER — EVALUATION (OUTPATIENT)
Dept: PHYSICAL THERAPY | Facility: CLINIC | Age: 57
End: 2022-08-15
Payer: COMMERCIAL

## 2022-08-15 ENCOUNTER — OFFICE VISIT (OUTPATIENT)
Dept: PAIN MEDICINE | Facility: CLINIC | Age: 57
End: 2022-08-15
Payer: COMMERCIAL

## 2022-08-15 ENCOUNTER — HOSPITAL ENCOUNTER (OUTPATIENT)
Dept: RADIOLOGY | Facility: HOSPITAL | Age: 57
Discharge: HOME | End: 2022-08-15
Payer: COMMERCIAL

## 2022-08-15 ENCOUNTER — TELEPHONE (OUTPATIENT)
Dept: RADIOLOGY | Facility: CLINIC | Age: 57
End: 2022-08-15

## 2022-08-15 VITALS
SYSTOLIC BLOOD PRESSURE: 117 MMHG | DIASTOLIC BLOOD PRESSURE: 97 MMHG | HEART RATE: 88 BPM | WEIGHT: 175 LBS | HEIGHT: 63 IN | BODY MASS INDEX: 31.01 KG/M2 | RESPIRATION RATE: 18 BRPM

## 2022-08-15 DIAGNOSIS — M51.14 INTERVERTEBRAL DISC DISORDER WITH RADICULOPATHY OF THORACIC REGION: ICD-10-CM

## 2022-08-15 DIAGNOSIS — G89.29 CHRONIC BILATERAL THORACIC BACK PAIN: ICD-10-CM

## 2022-08-15 DIAGNOSIS — M54.9 MID BACK PAIN: ICD-10-CM

## 2022-08-15 DIAGNOSIS — M54.16 LUMBAR RADICULOPATHY: ICD-10-CM

## 2022-08-15 DIAGNOSIS — M54.9 MID BACK PAIN: Primary | ICD-10-CM

## 2022-08-15 DIAGNOSIS — M54.50 MIDLINE LOW BACK PAIN WITHOUT SCIATICA, UNSPECIFIED CHRONICITY: ICD-10-CM

## 2022-08-15 DIAGNOSIS — R07.81 RIB PAIN: ICD-10-CM

## 2022-08-15 DIAGNOSIS — M54.6 CHRONIC BILATERAL THORACIC BACK PAIN: ICD-10-CM

## 2022-08-15 DIAGNOSIS — G89.4 CHRONIC PAIN SYNDROME: Primary | ICD-10-CM

## 2022-08-15 PROCEDURE — 99214 OFFICE O/P EST MOD 30 MIN: CPT

## 2022-08-15 PROCEDURE — 97161 PT EVAL LOW COMPLEX 20 MIN: CPT | Performed by: PHYSICAL THERAPIST

## 2022-08-15 PROCEDURE — 97110 THERAPEUTIC EXERCISES: CPT | Performed by: PHYSICAL THERAPIST

## 2022-08-15 PROCEDURE — 71111 X-RAY EXAM RIBS/CHEST4/> VWS: CPT

## 2022-08-15 PROCEDURE — 72072 X-RAY EXAM THORAC SPINE 3VWS: CPT

## 2022-08-15 NOTE — TELEPHONE ENCOUNTER
----- Message from Noah Nelson, 10 Jennifer Dickens sent at 8/15/2022 12:53 PM EDT -----  Please call patient with x-ray of ribs and x ray of thoracic spine showing possible trace bilateral pleural effusions  She needs to follow-up with her PCP, have her call them ASAP as patient is leaving for [place] within the next week

## 2022-08-15 NOTE — PROGRESS NOTES
PT Evaluation     Today's date: 8/15/2022  Patient name: Wilberto Lovell  : 1965  MRN: [id number]  Referring provider: CYNDI Barlow  Dx:   Encounter Diagnosis     ICD-10-CM    1  Mid back pain  M54 9    2  Midline low back pain without sciatica, unspecified chronicity  M54 50    3  Lumbar radiculopathy  M54 16 Ambulatory referral to Physical Therapy                  Assessment  Assessment details: Wilberto Lovell is a 62 y o  female who presents with chronic mid back and LBP  Pt has decreased lumbar mobility due to pain as well as hypomobility t/o thoracic spine  Pt currently has difficulty with prolonged sitting, unable to lift anything, difficulty bending/twisting  Pt would benefit from skilled PT to address the above impairments and to return to pain free function  Impairments: abnormal or restricted ROM, impaired physical strength, lacks appropriate home exercise program and pain with function  Functional limitations: difficulty with prolonged sitting, unable to lift anything, difficulty bending/twisting  Symptom irritability: moderateUnderstanding of Dx/Px/POC: good   Prognosis: good    Goals  1  Pt will be independent with HEP upon DC  2  Pt's lumbar mobility will be WNL and pain free to allow for bending/twisting with ease  3  Pt will demonstrate proper posture to decrease frequency of flare ups  4  Pt's pain will be no more than 3/10 to allow for prolonged sitting        Plan  Patient would benefit from: skilled physical therapy  Planned modality interventions: low level laser therapy  Planned therapy interventions: joint mobilization, manual therapy, neuromuscular re-education, patient education, therapeutic activities, therapeutic exercise, strengthening and home exercise program  Frequency: 2x week  Duration in visits: 12  Duration in weeks: 6  Treatment plan discussed with: patient        Subjective Evaluation    History of Present Illness  Date of onset: 8/10/2022  Mechanism of injury: Pt reports a chronic history of midback pain  Pt had a flare up that began while she was on vacation about a week ago  Since the onset she reports that her pain has now radiated into her LB  Pt reports that midback pain is on the R side however LBP is midline  Pt denies radicular symptoms  X-rays showed a possible pleural effusion  Pt has had injections in the past which have no helped  Pt has been taking muscle relaxers and motrin for pain control  Pt presents to OP PT  Not a recurrent problem   Quality of life: good    Pain  Current pain ratin  At best pain ratin  At worst pain ratin  Location: midback/LB  Quality: sharp and dull ache  Progression: improved      Diagnostic Tests  No diagnostic tests performed  Treatments  Previous treatment: medication and injection treatment  Patient Goals  Patient goals for therapy: decreased pain          Objective     Palpation   Left   Muscle spasm in the cervical paraspinals  Tenderness of the cervical paraspinals and lumbar paraspinals  Right   Muscle spasm in the cervical paraspinals  Tenderness of the cervical paraspinals and lumbar paraspinals       Active Range of Motion     Lumbar   Flexion:  with pain Restriction level: moderate  Extension:  with pain Restriction level: maximal  Left lateral flexion:  WFL and with pain  Right lateral flexion:  WFL and with pain  Left rotation:  WFL and with pain  Right rotation:  WFL and with pain    Joint Play     Hypermobile: L2, L3, L4 and L5     Hypomobile: T4, T5, T6 and T7     Pain: T4, T5, T6 and T7              Precautions: none      Manuals 8/15            PA glides to thoracic                                                    Neuro Re-Ed             Cane ext with scap sq             Posture tband             Doorway stretch                                                                 Ther Ex             UBE (back)             Open book stretch 5x10" ea Seated thoracic stretch 3x30"            Scap squeezes 5"x15                                                                             Ther Activity

## 2022-08-15 NOTE — PROGRESS NOTES
Assessment:  1  Chronic pain syndrome    2  Chronic bilateral thoracic back pain    3  Lumbar radiculopathy    4  Rib pain    5  Mid back pain    6  Intervertebral disc disorder with radiculopathy of thoracic region        Plan:  The patient is a 59-year-old female with a history of chronic pain secondary to low back pain, midback pain, thoracic intervertebral disc disorder with radiculopathy, cervical disc disorder with radiculopathy, lumbar intervertebral disc disorder with radiculopathy and myofascial pain who presents to the office with ongoing midback pain and worsening bilateral low back pain  At this time I will refer patient to physical therapy for mid and low back pain  I instructed patient if she is continuing to experience low back pain after completing 4-6 weeks of physical therapy, we can proceed in order an MRI of her lumbar spine  At this time, patient does not want to repeat T9 interlaminar epidural injection, as she feels it did not provide her much relief  I will order x-rays of her thoracic spine as well as x-rays of her ribs to evaluate for any pathology that may be causing her midback pain that radiates to the front of her chest   I instructed her I will call once I receive the results  My impressions and treatment recommendations were discussed in detail with the patient who verbalized understanding and had no further questions  Discharge instructions were provided  I personally saw and examined the patient and I agree with the above discussed plan of care  Orders Placed This Encounter   Procedures    XR ribs bilateral 4+ vw w pa chest     Standing Status:   Future     Number of Occurrences:   1     Standing Expiration Date:   8/15/2026     Scheduling Instructions:      Bring along any outside films relating to this procedure  Order Specific Question:   Is the patient pregnant?      Answer:   No    X-ray thoracic spine 3 views     Standing Status:   Future     Number of Occurrences:   1     Standing Expiration Date:   8/15/2026     Scheduling Instructions:      Bring along any outside films relating to this procedure  Order Specific Question:   Is the patient pregnant? Answer:   No    Ambulatory referral to Physical Therapy     Standing Status:   Future     Standing Expiration Date:   8/15/2023     Referral Priority:   Routine     Referral Type:   Physical Therapy     Referral Reason:   Specialty Services Required     Requested Specialty:   Physical Therapy     Number of Visits Requested:   1     Expiration Date:   8/15/2023     No orders of the defined types were placed in this encounter  History of Present Illness:  Kale Adamson is a 62 y o  female with a history of chronic pain secondary to low back pain, midback pain, thoracic intervertebral disc disorder with radiculopathy, cervical disc disorder with radiculopathy, lumbar intervertebral disc disorder with radiculopathy and myofascial pain  She was last seen on 06/21/2022 where she underwent a T9 interlaminar epidural injection which provided her little relief of her midback pain  She presents to the office with ongoing midback pain that radiates around her ribs and worsening bilateral low back pain  She states her pain is worse since the last office visit and constant but worse in the morning and evening  She rates the quality of her pain as burning, dull/aching, sharp and is currently rating it a 5/10 on a numeric scale  Current pain medications include cyclobenzaprine 10 mg 1 tablet 3 times a day as needed for muscle spasms  She is also taking Tylenol Motrin as needed  She states this medication regimen is providing her mild relief of her pain symptoms without side effects  She was also recently on a Medrol Dosepak which helped her midback pain however her midback pain returned once she completed the steroids      I have personally reviewed and/or updated the patient's past medical history, past surgical history, family history, social history, current medications, allergies, and vital signs today  Review of Systems   Respiratory: Negative for shortness of breath  Cardiovascular: Negative for chest pain  Gastrointestinal: Positive for constipation  Negative for diarrhea, nausea and vomiting  Musculoskeletal: Positive for back pain and gait problem  Negative for arthralgias, joint swelling and myalgias  Skin: Negative for rash  Neurological: Negative for dizziness, seizures and weakness  All other systems reviewed and are negative        Patient Active Problem List   Diagnosis    Allergic rhinitis    Allergic rhinitis due to pollen    Arthralgia of hip, left    Pain in left foot    Basal cell carcinoma (BCC) of skin of trunk    Degenerative lumbar disc    Carpal tunnel syndrome of right wrist    Common migraine    Degenerative lumbar spinal stenosis    Dietary calcium deficiency    Diverticulosis    Facet degeneration of lumbar region    Fibromyalgia    Foraminal stenosis of cervical region    Frequent UTI    Functional urinary incontinence    Gait instability    Herniated thoracic disc without myelopathy    Increased frequency of urination    Ingrown nail    Irritable colon    Knee pain    Lactose intolerance    Left acute otitis media    Left elbow pain    Thoracic back pain    Metrorrhagia    Mixed hyperlipidemia    Multinodular goiter    Paresthesia of both hands    Situational anxiety    SUZETTE (stress urinary incontinence, female)    Titubation    Urinary tract infection    Anaphylactic syndrome    Involuntary movements    Cervical disc disorder with radiculopathy of mid-cervical region    Intervertebral disc disorder with radiculopathy of thoracic region       Past Medical History:   Diagnosis Date    Abnormal Pap smear of cervix     Allergic rhinitis     Anaphylaxis     Arthritis     Back pain     due to herniated discs    Basal cell carcinoma (BCC) of skin of trunk 5/17/2019    Carpal tunnel syndrome     Female infertility     Fibromyalgia     GERD (gastroesophageal reflux disease)     Migraine     Multinodular goiter 11/22/2017    Seasonal allergies     Thyroid nodule     Varicella     Wears glasses     reading only        Past Surgical History:   Procedure Laterality Date    COLONOSCOPY      EGD      MAMMO STEREOTACTIC BREAST BIOPSY RIGHT (ALL INC) Right 04/09/2003    Benign    TUBAL LIGATION  1993    WISDOM TOOTH EXTRACTION         Family History   Problem Relation Age of Onset    Diabetes Mother     Hyperlipidemia Mother     Allergic rhinitis Mother     Parkinsonism Father     Diabetes Maternal Aunt     Hyperlipidemia Maternal Aunt     Allergic rhinitis Daughter     No Known Problems Daughter     Breast cancer Half-Sister 40    No Known Problems Brother     No Known Problems Sister     Breast cancer Sister     No Known Problems Brother     No Known Problems Brother     Thyroid disease unspecified Brother     Colon cancer Neg Hx     Uterine cancer Neg Hx     Ovarian cancer Neg Hx        Social History     Occupational History    Not on file   Tobacco Use    Smoking status: Never Smoker    Smokeless tobacco: Never Used   Vaping Use    Vaping Use: Never used   Substance and Sexual Activity    Alcohol use: No    Drug use: Never    Sexual activity: Yes     Partners: Male     Birth control/protection: None       Current Outpatient Medications on File Prior to Visit   Medication Sig    Acetaminophen (TYLENOL PO) Take by mouth as needed    ALPRAZolam (XANAX) 0 5 mg tablet 1/2 to 1tab 30mins before flight and may repeat dose if needed    calcium carbonate (TUMS) 500 mg chewable tablet Chew 1 tablet daily    cyclobenzaprine (FLEXERIL) 10 mg tablet Take 1 tablet (10 mg total) by mouth 3 (three) times a day as needed for muscle spasms    etodolac (LODINE) 400 MG tablet take 1 tablet by mouth once daily if needed for moderate pain take with food    Famotidine (PEPCID PO) Take by mouth daily      fexofenadine (ALLEGRA) 180 MG tablet Take 180 mg by mouth daily    ibuprofen (MOTRIN) 200 mg tablet Take by mouth every 6 (six) hours as needed for mild pain (pt takes (2) as needed)    methylPREDNISolone 4 MG tablet therapy pack Use as directed on package    MULTIPLE VITAMIN PO Take by mouth     No current facility-administered medications on file prior to visit  Allergies   Allergen Reactions    Omeprazole Anaphylaxis    Pantoprazole Anaphylaxis     ER VISIT 2015    Lidocaine Rash     With myalgias    Dairy Aid [Lactase]     Levofloxacin Swelling     Throat swelling, GI intolerance, anxiety       Physical Exam:    /97   Pulse 88   Resp 18   Ht 5' 3" (1 6 m)   Wt 79 4 kg (175 lb)   LMP  (LMP Unknown)   BMI 31 00 kg/m²     Constitutional:normal, well developed, well nourished, alert, in no distress and non-toxic and no overt pain behavior    Eyes:anicteric  HEENT:grossly intact  Neck:supple, symmetric, trachea midline and no masses   Pulmonary:even and unlabored  Cardiovascular:No edema or pitting edema present  Skin:Normal without rashes or lesions and well hydrated  Psychiatric:Mood and affect appropriate  Neurologic:Cranial Nerves II-XII grossly intact  Musculoskeletal:normal    Lumbar Spine Exam    Appearance:  Normal lordosis  Palpation/Tenderness:  left lumbar paraspinal tenderness  right lumbar paraspinal tenderness  Sensory:  no sensory deficits noted  Range of Motion:  Flexion:  Minimally limited  with pain  Extension:  Minimally limited  with pain  Motor Strength:  Left hip flexion:  5/5  Right hip flexion:  5/5  Left knee flexion:  5/5  Left knee extension:  5/5  Right knee flexion:  5/5  Right knee extension:  5/5  Left foot dorsiflexion:  5/5  Left foot plantar flexion:  5/5  Right foot dorsiflexion:  5/5  Right foot plantar flexion:  5/5    Imaging

## 2022-08-15 NOTE — TELEPHONE ENCOUNTER
Pt informed that Felix Morocho reviewed her xray results from today and she has degenerative changes of her thoracic spine and it was also noted that she has trace B/L pleural effusions  Per Felix Morocho pt was told she needs to call her PCP before going to [place] regarding the pleural effusion  Pt understood and will call the PCP now

## 2023-03-23 ENCOUNTER — OFFICE VISIT (OUTPATIENT)
Dept: NEUROSURGERY | Facility: CLINIC | Age: 58
End: 2023-03-23

## 2023-03-23 VITALS
DIASTOLIC BLOOD PRESSURE: 78 MMHG | RESPIRATION RATE: 14 BRPM | HEART RATE: 76 BPM | HEIGHT: 63 IN | SYSTOLIC BLOOD PRESSURE: 118 MMHG | BODY MASS INDEX: 30.3 KG/M2 | TEMPERATURE: 97.9 F | WEIGHT: 171 LBS

## 2023-03-23 DIAGNOSIS — M54.12 RADICULOPATHY, CERVICAL: Primary | ICD-10-CM

## 2023-03-23 RX ORDER — TIZANIDINE HYDROCHLORIDE 4 MG/1
4 CAPSULE, GELATIN COATED ORAL 3 TIMES DAILY
Qty: 30 CAPSULE | Refills: 0 | Status: SHIPPED | OUTPATIENT
Start: 2023-03-23

## 2023-03-23 NOTE — PROGRESS NOTES
Office Note - Neurosurgery   Omer Serrato 62 y o  female MRN: [id number]      Assessment:  Patient is a 62years old pleasant woman with past medical history of basal cell carcinoma, carpal tunnel syndrome, migraine headache, multinodular goiter, fibromyalgia, situational anxiety, here today for worsening cervical radiculopathy  Patient was seen in January 2022 for neck pain, "referred to pain management and physical therapy  She tried multiple injections and also physical therapy  Injections did not help pain but physical therapy relieves some of her back and shoulder blade pain on the right  However she continues to experience new right arm radiculopathy associated with paresthesia and occasional numbness  Denies any weakness, fine motor dysfunction, or  weakness  Denies any dropping objects  No gait issues or fall tendency  Currently, taking NSAIDs and Tylenol  Exam-A&OX3  Aimee  Strength 5/5 bilaterally including  and fine motor function  Sensation to light intact throughout, except slight C5-C6 distribution LT discrepancy on the right arm   DTR 2+ without clonus bilaterally  Gait instability noted on heel to toes walking  Hx PEx and images reviewed with the patient  Mx plan discussed  Patient tried PT and Pain Mx, but she started experiencing new radicular UE symptoms  Her previous MRI done on 3/22/2021 demonstrates cervical degenerative disease most pronounced at C5-6 level  I would recommend updated MRI of cervical spine to evaluate C5-6 NFN and stenosis  Patient is advised to continue with conservative Mx, PT and take muscle relaxer and antiinflammatory meds  All questions and concerns were answered to patient's satisfaction  Patient expressed her understandings and agreed with the plan  Plan:  1  MRI of cervical spine  wo contrast  2  BUN & Creatinine  3  F/U after images results, SNPx Dr Telly Romo  4   Continue PT, script for muscle relaxer Tizanidine sent to her pharmacy  5  Call with questions or cocnerns        Subjective/Objective     C/C: "  Here for progressive right arm radiculopathy"        HPI  All patient's medical histories were reviewed and updated as appropriate: Allergies, current medication list, past medical history, past surgical history, family history, social history, and current medical lists  Patient was seen in June 2022 for her right-sided neck pain, at that time she was advised to follow-up with pain management and physical therapy  Patient tried injections and also physical therapy  She reports improvement after physical therapy but her pain did not go away and recently she started experiencing right arm radiculopathy  She also noticed associated intermittent paresthesia and numbness along the right upper extremity but denies any weakness or fine motor dysfunction  Denies dropping objects  No gait issues or bowel/ bladder dysfunction  Patient denies fever, chills, rigors, cough or chest pain  ROS  Review of system personally reviewed and limited  Review of Systems   Constitutional: Negative  HENT: Negative  Eyes: Negative  Respiratory: Negative  Cardiovascular: Negative  Gastrointestinal: Negative  Endocrine: Negative  Genitourinary: Positive for enuresis  Urinary incontinence     Musculoskeletal: Positive for back pain (Thoracic pain radiates up to her neck  and b/ l shoulder), gait problem (unbalanced when walking), neck pain ( 4/10 neck pain to Right shoulder blade  ) and neck stiffness (right sided, Decrease ROM when turning her head to the right and putting her head forward)  FU SHOULDER PAIN THAT RADIATES DOWN RT ARM    PT evaluation 8/15/22 ( helpful)     [facility] w Dr Mikki Jones 6/21/22 (70% relief ) ( not helpful)     rates pain 4/10 neck pain to Right shoulder blade      meds flexeril & lodine   Skin: Negative  Allergic/Immunologic: Positive for environmental allergies          On [facility]: Positive for light-headedness and numbness (right arm into hands )  Negative for weakness and headaches  Hematological: Negative  Psychiatric/Behavioral: Negative  All other systems reviewed and are negative        Family History    Family History   Problem Relation Age of Onset   • Diabetes Mother    • Hyperlipidemia Mother    • Allergic rhinitis Mother    • Parkinsonism Father    • Diabetes Maternal Aunt    • Hyperlipidemia Maternal Aunt    • Allergic rhinitis Daughter    • No Known Problems Daughter    • Breast cancer Half-Sister 40   • No Known Problems Brother    • No Known Problems Sister    • Breast cancer Sister    • No Known Problems Brother    • No Known Problems Brother    • Thyroid disease unspecified Brother    • Colon cancer Neg Hx    • Uterine cancer Neg Hx    • Ovarian cancer Neg Hx        Social History    Social History     Socioeconomic History   • Marital status: /Civil Union     Spouse name: Claire Marie    • Number of children: 3   • Years of education: Not on file   • Highest education level: Not on file   Occupational History   • Not on file   Tobacco Use   • Smoking status: Never   • Smokeless tobacco: Never   Vaping Use   • Vaping Use: Never used   Substance and Sexual Activity   • Alcohol use: No   • Drug use: Never   • Sexual activity: Yes     Partners: Male     Birth control/protection: None   Other Topics Concern   • Not on file   Social History Narrative        Lives with spouse          Home  older house      Air   uses    A/C  central    Basement dry and finished and unfinished    Dehumidifier none    Floors  Carpet and wood    Heat  forced air propane    Pets  NONE    Smoke Exposure:  NONE    Exposures children      students              Who lives in your home:     What type of home do you live in: Single house    Age of your home: Built 2000    How long have you been living there: 2000    Type of heat: Forced hot air    Type of fuel: Propane What type of osmany is in your bedroom: Carpet    Do you have the following in or near your home:    Air products: Central air and Ionic air purifier    Pests: None    Pets: None    Are pets allowed in bedroom: N/A    Open fields, wooded areas nearby: Open fields and Wooded areas    Basement: Dry and Finished    Exposure to second hand smoke: No        Habits:    Caffeine: coffee 2 cups daily-occasional ice tea     Chocolate: rare     Other:         Social Determinants of Health     Financial Resource Strain: Not on file   Food Insecurity: Not on file   Transportation Needs: Not on file   Physical Activity: Not on file   Stress: Not on file   Social Connections: Not on file   Intimate Partner Violence: Not on file   Housing Stability: Not on file       Past Medical History    Past Medical History:   Diagnosis Date   • Abnormal Pap smear of cervix    • Allergic rhinitis    • Anaphylaxis    • Arthritis    • Back pain     due to herniated discs   • Basal cell carcinoma (BCC) of skin of trunk 5/17/2019   • Carpal tunnel syndrome    • Female infertility    • Fibromyalgia    • GERD (gastroesophageal reflux disease)    • Migraine    • Multinodular goiter 11/22/2017   • Seasonal allergies    • Thyroid nodule    • Varicella    • Wears glasses     reading only        Surgical History    Past Surgical History:   Procedure Laterality Date   • COLONOSCOPY     • EGD     • MAMMO STEREOTACTIC BREAST BIOPSY RIGHT (ALL INC) Right 04/09/2003    Benign   • TUBAL LIGATION  1993   • WISDOM TOOTH EXTRACTION         Medications      Current Outpatient Medications:   •  Acetaminophen (TYLENOL PO), Take by mouth as needed, Disp: , Rfl:   •  ALPRAZolam (XANAX) 0 5 mg tablet, 1/2 to 1tab 30mins before flight and may repeat dose if needed, Disp: , Rfl:   •  calcium carbonate (TUMS) 500 mg chewable tablet, Chew 1 tablet daily, Disp: , Rfl:   •  cyclobenzaprine (FLEXERIL) 10 mg tablet, Take 1 tablet (10 mg total) by mouth 3 (three) times a day as needed for muscle spasms, Disp: 90 tablet, Rfl: 5  •  etodolac (LODINE) 400 MG tablet, take 1 tablet by mouth once daily if needed for moderate pain take with food, Disp: , Rfl:   •  Famotidine (PEPCID PO), Take by mouth daily  , Disp: , Rfl:   •  fexofenadine (ALLEGRA) 180 MG tablet, Take 180 mg by mouth daily, Disp: , Rfl:   •  ibuprofen (MOTRIN) 200 mg tablet, Take by mouth every 6 (six) hours as needed for mild pain (pt takes (2) as needed), Disp: , Rfl:   •  methylPREDNISolone 4 MG tablet therapy pack, Use as directed on package, Disp: 21 tablet, Rfl: 0  •  MULTIPLE VITAMIN PO, Take by mouth, Disp: , Rfl:     Allergies    Allergies   Allergen Reactions   • Omeprazole Anaphylaxis   • Pantoprazole Anaphylaxis     ER VISIT 2015   • Lidocaine Rash     With myalgias   • Dairy Aid [Tilactase]    • Levofloxacin Swelling     Throat swelling, GI intolerance, anxiety       The following portions of the patient's history were reviewed and updated as appropriate: allergies, current medications, past family history, past medical history, past social history, past surgical history and problem list     Investigations    I personally reviewed the MRI results with the patient:        Physical Exam    Vitals:    03/23/23 1342   Resp: 14       Physical Exam  Constitutional:       Appearance: Normal appearance  HENT:      Head: Normocephalic and atraumatic  Eyes:      Extraocular Movements: Extraocular movements intact  Cardiovascular:      Rate and Rhythm: Normal rate  Pulses: Normal pulses  Pulmonary:      Effort: Pulmonary effort is normal    Musculoskeletal:         General: Tenderness present  Cervical back: Tenderness present  Neurological:      General: No focal deficit present  Mental Status: She is alert and oriented to person, place, and time  GCS: GCS eye subscore is 4  GCS verbal subscore is 5  GCS motor subscore is 6  Cranial Nerves: Cranial nerves 2-12 are intact  Sensory: Sensory deficit present  Motor: Motor function is intact  Coordination: Finger-Nose-Finger Test normal       Deep Tendon Reflexes: Reflexes are normal and symmetric  Reflex Scores:       Tricep reflexes are 2+ on the right side and 2+ on the left side  Bicep reflexes are 2+ on the right side and 2+ on the left side  Brachioradialis reflexes are 2+ on the right side and 2+ on the left side  Patellar reflexes are 2+ on the right side and 2+ on the left side  Achilles reflexes are 2+ on the right side and 2+ on the left side  Psychiatric:         Speech: Speech normal        Neurologic Exam     Mental Status   Oriented to person, place, and time  Speech: speech is normal   Level of consciousness: alert    Cranial Nerves   Cranial nerves II through XII intact       CN III, IV, VI   Nystagmus: none     CN XI   CN XI normal      Motor Exam   Muscle bulk: normal  Overall muscle tone: normal  Right arm tone: normal  Left arm tone: normal  Right arm pronator drift: absent  Left arm pronator drift: absent  Right leg tone: normal  Left leg tone: normal    Sensory Exam   Right arm light touch: decreased from elbow  Left arm light touch: normal  Right leg light touch: normal  Left leg light touch: normal    Gait, Coordination, and Reflexes     Coordination   Finger to nose coordination: normal    Reflexes   Right brachioradialis: 2+  Left brachioradialis: 2+  Right biceps: 2+  Left biceps: 2+  Right triceps: 2+  Left triceps: 2+  Right patellar: 2+  Left patellar: 2+  Right achilles: 2+  Left achilles: 2+  Right : 2+  Left : 2+  Right Shen: absent  Left Shen: absent  Right ankle clonus: absent  Left pendular knee jerk: absent

## 2023-05-01 ENCOUNTER — HOSPITAL ENCOUNTER (OUTPATIENT)
Dept: MRI IMAGING | Facility: HOSPITAL | Age: 58
Discharge: HOME | End: 2023-05-01

## 2023-05-01 DIAGNOSIS — M54.12 RADICULOPATHY, CERVICAL: ICD-10-CM

## 2023-06-05 ENCOUNTER — OFFICE VISIT (OUTPATIENT)
Dept: NEUROSURGERY | Facility: CLINIC | Age: 58
End: 2023-06-05
Payer: COMMERCIAL

## 2023-06-05 VITALS
BODY MASS INDEX: 30.83 KG/M2 | HEART RATE: 90 BPM | RESPIRATION RATE: 14 BRPM | TEMPERATURE: 98.2 F | HEIGHT: 63 IN | SYSTOLIC BLOOD PRESSURE: 114 MMHG | DIASTOLIC BLOOD PRESSURE: 78 MMHG | WEIGHT: 174 LBS | OXYGEN SATURATION: 95 %

## 2023-06-05 DIAGNOSIS — M54.12 RADICULOPATHY, CERVICAL: Primary | ICD-10-CM

## 2023-06-05 PROCEDURE — 99213 OFFICE O/P EST LOW 20 MIN: CPT | Performed by: PHYSICIAN ASSISTANT

## 2023-06-05 NOTE — PROGRESS NOTES
"Office Note - Neurosurgery   Ken Mejias 62 y o  female MRN: [id number]      Assessment:    Patient is a 62 yrs old pleasant woman with Hx of progressive neck pain with right R>L arm radiculopathy  She noticed intermittent  weakness and occasional fine motor dysfunction  She noticed improvement with her pain, estimated 4/10 , She also reported gait instability, but denies fall tendency  No B/B dysfunction  Patient takes Ibuprofen  She doesn't like taking pain meds that much  She tried [facility] and PT in the past      MRI of cervical spine on 5/1/2023 demonstrates Degenerative disc disease at the C5-6 and C6-7 levels slightly progressed compared to 3/22/2021 with no new disc herniation   Persistent mild to moderate central canal narrowing at C5-6 with discogenic changes abutting the ventral aspect of the cord  Moderate to severe right and moderate left C5-6 neural foraminal narrowing, progressed  Exam- A&OX3  Aimee  Strength 5/5 bilaterally including  and iOS  Sensation to LT intact bilaterally  DTR 2+ no clonus bilaterally  No Shen's bilaterally  Stable gait on heel to toes walking  Hx, PEx, and images reviewed with the patient  Mx plan discussed  Patient feeling better with her neck and radicular pain  Advised to call office with worsening/progressive neurological symptoms  continue Ibuprofen  Fall precaution, avoid axial loading or deep cervical spine manipulation  All questions and concerns were answered to patient's satisfaction  Patient verbalized her understandings and agreed with the plan  Plan:  1  Patient's cervical radic 4/10 today, reports feeling better  2  Advised to call office with progressive/worsening symptoms  3  F/U with pain Mx  4  Continue PT  5  Fall precautions, avoid deep cervical manipulation, axial loading  6  Call with questions or concerns      Subjective/Objective     C/C: \" Neck pain with RUE radic, clinical follow up\"    HPI   All patient's medical Hx were reviewed " and updated as follows: Allergies, current medications, Past medical Hx, Past surgical Hx, Family Hx, Social Hx, and current medical lists  Patient with progressive neck pain with R>L UE radiculopathy  She reported improvement with her pain and radiculopathy, she estimated the pain 4/10, taking Ibuprofen  Occasional  gait issues but denies fall precaution  No B/B dysfunction  She had multiple  [facility] and PT  Patient denies fever, chills, rigors, cough or chest pain  ROS  ROS personally reviewed and updated  Review of Systems   Constitutional: Negative  HENT: Negative  Eyes: Negative  Respiratory: Negative  Cardiovascular: Negative  Gastrointestinal: Negative  Endocrine: Negative  Genitourinary: Positive for enuresis  Urinary incontinence     Musculoskeletal: Positive for back pain (Thoracic pain radiates up to her neck  and b/ l shoulder), gait problem (unbalanced when walking), neck pain ( 4/10 neck pain to Right shoulder blade  ) and neck stiffness (right sided, Decrease ROM when turning her head to the right and putting her head forward)  FU SHOULDER PAIN THAT RADIATES DOWN RT ARM    PT evaluation 8/15/22 ( helpful)     [facility] w Dr Amanda Rocha 6/21/22 (70% relief ) ( not helpful)     rates pain 4/10 neck pain to Right shoulder blade      meds flexeril & lodine   Skin: Negative  Allergic/Immunologic: Positive for environmental allergies  On Allegra     Neurological: Positive for light-headedness and numbness (somewhat improving-right arm into hands - tingling on Right cheek )  Negative for weakness and headaches  Hematological: Negative  Psychiatric/Behavioral: Negative  All other systems reviewed and are negative        Family History    Family History   Problem Relation Age of Onset   • Diabetes Mother    • Hyperlipidemia Mother    • Allergic rhinitis Mother    • Parkinsonism Father    • Diabetes Maternal Aunt    • Hyperlipidemia Maternal Aunt    • Allergic rhinitis Daughter    • No Known Problems Daughter    • Breast cancer Half-Sister 40   • No Known Problems Brother    • No Known Problems Sister    • Breast cancer Sister    • No Known Problems Brother    • No Known Problems Brother    • Thyroid disease unspecified Brother    • Colon cancer Neg Hx    • Uterine cancer Neg Hx    • Ovarian cancer Neg Hx        Social History    Social History     Socioeconomic History   • Marital status: /Civil Union     Spouse name: David Negrete    • Number of children: 3   • Years of education: Not on file   • Highest education level: Not on file   Occupational History   • Not on file   Tobacco Use   • Smoking status: Never   • Smokeless tobacco: Never   Vaping Use   • Vaping Use: Never used   Substance and Sexual Activity   • Alcohol use: No   • Drug use: Never   • Sexual activity: Yes     Partners: Male     Birth control/protection: None   Other Topics Concern   • Not on file   Social History Narrative        Lives with spouse          Home  older house      Air   uses    A/C  central    Basement dry and finished and unfinished    Dehumidifier none    Floors  Carpet and wood    Heat  forced air propane    Pets  NONE    Smoke Exposure:  NONE    Exposures children      students              Who lives in your home:     What type of home do you live in: Single house    Age of your home: Built 2000    How long have you been living there: 2000    Type of heat: Forced hot air    Type of fuel: Propane    What type of osmany is in your bedroom: Carpet    Do you have the following in or near your home:    Air products: Central air and Ionic air purifier    Pests: None    Pets: None    Are pets allowed in bedroom: N/A    Open fields, wooded areas nearby: Open fields and Wooded areas    Basement: Dry and Finished    Exposure to second hand smoke: No        Habits:    Caffeine: coffee 2 cups daily-occasional ice tea     Chocolate: rare     Other:         Social Determinants of Health     Financial Resource Strain: Not on file   Food Insecurity: Not on file   Transportation Needs: Not on file   Physical Activity: Sufficiently Active (5/19/2020)    Exercise Vital Sign    • Days of Exercise per Week: 5 days    • Minutes of Exercise per Session: 60 min   Stress: Not on file   Social Connections: Not on file   Intimate Partner Violence: Not on file   Housing Stability: Not on file       Past Medical History    Past Medical History:   Diagnosis Date   • Abnormal Pap smear of cervix    • Allergic rhinitis    • Anaphylaxis    • Arthritis    • Back pain     due to herniated discs   • Basal cell carcinoma (BCC) of skin of trunk 5/17/2019   • Carpal tunnel syndrome    • Female infertility    • Fibromyalgia    • GERD (gastroesophageal reflux disease)    • Migraine    • Multinodular goiter 11/22/2017   • Seasonal allergies    • Thyroid nodule    • Varicella    • Wears glasses     reading only        Surgical History    Past Surgical History:   Procedure Laterality Date   • COLONOSCOPY     • EGD     • MAMMO STEREOTACTIC BREAST BIOPSY RIGHT (ALL INC) Right 04/09/2003    Benign   • TUBAL LIGATION  1993   • WISDOM TOOTH EXTRACTION         Medications      Current Outpatient Medications:   •  Acetaminophen (TYLENOL PO), Take by mouth as needed, Disp: , Rfl:   •  ALPRAZolam (XANAX) 0 5 mg tablet, 1/2 to 1tab 30mins before flight and may repeat dose if needed, Disp: , Rfl:   •  calcium carbonate (TUMS) 500 mg chewable tablet, Chew 1 tablet daily, Disp: , Rfl:   •  cyclobenzaprine (FLEXERIL) 10 mg tablet, Take 1 tablet (10 mg total) by mouth 3 (three) times a day as needed for muscle spasms, Disp: 90 tablet, Rfl: 5  •  etodolac (LODINE) 400 MG tablet, take 1 tablet by mouth once daily if needed for moderate pain take with food, Disp: , Rfl:   •  Famotidine (PEPCID PO), Take by mouth daily   (Patient not taking: Reported on 3/23/2023), Disp: , Rfl:   •  fexofenadine (ALLEGRA) 180 MG tablet, Take 180 mg by mouth daily PRN, Disp: , Rfl:   •  ibuprofen (MOTRIN) 200 mg tablet, Take by mouth every 6 (six) hours as needed for mild pain (pt takes (2) as needed), Disp: , Rfl:   •  methylPREDNISolone 4 MG tablet therapy pack, Use as directed on package, Disp: 21 tablet, Rfl: 0  •  MULTIPLE VITAMIN PO, Take by mouth, Disp: , Rfl:   •  TiZANidine (ZANAFLEX) 4 MG capsule, Take 1 capsule (4 mg total) by mouth 3 (three) times a day, Disp: 30 capsule, Rfl: 0    Allergies    Allergies   Allergen Reactions   • Omeprazole Anaphylaxis   • Pantoprazole Anaphylaxis     ER VISIT 2015   • Lidocaine Rash     With myalgias   • Levofloxacin Swelling     Throat swelling, GI intolerance, anxiety   • Tilactase        The following portions of the patient's history were reviewed and updated as appropriate: allergies, current medications, past family history, past medical history, past social history, past surgical history and problem list     Investigations    I personally reviewed the MRI results with the patient:        Physical Exam    There were no vitals filed for this visit  Physical Exam  Constitutional:       Appearance: Normal appearance  HENT:      Head: Normocephalic and atraumatic  Cardiovascular:      Rate and Rhythm: Normal rate  Pulses: Normal pulses  Pulmonary:      Effort: Pulmonary effort is normal    Musculoskeletal:         General: Tenderness present  Cervical back: Tenderness present  Neurological:      General: No focal deficit present  Mental Status: She is alert and oriented to person, place, and time  GCS: GCS eye subscore is 4  GCS verbal subscore is 5  GCS motor subscore is 6  Cranial Nerves: Cranial nerves 2-12 are intact  Sensory: Sensation is intact  Motor: Motor function is intact  Deep Tendon Reflexes: Reflexes are normal and symmetric  Reflex Scores:       Tricep reflexes are 2+ on the right side and 2+ on the left side         Bicep reflexes are 2+ on the right side and 2+ on the left side  Brachioradialis reflexes are 2+ on the right side and 2+ on the left side  Patellar reflexes are 2+ on the right side and 2+ on the left side  Achilles reflexes are 2+ on the right side and 2+ on the left side  Psychiatric:         Speech: Speech normal        Neurologic Exam     Mental Status   Oriented to person, place, and time  Speech: speech is normal   Level of consciousness: alert    Cranial Nerves   Cranial nerves II through XII intact       CN III, IV, VI   Nystagmus: none     CN XI   CN XI normal      Motor Exam   Muscle bulk: normal  Overall muscle tone: normal  Right arm tone: normal  Left arm tone: normal  Right arm pronator drift: absent  Left arm pronator drift: absent  Right leg tone: normal  Left leg tone: normal    Sensory Exam   Light touch normal      Gait, Coordination, and Reflexes     Reflexes   Right brachioradialis: 2+  Left brachioradialis: 2+  Right biceps: 2+  Left biceps: 2+  Right triceps: 2+  Left triceps: 2+  Right patellar: 2+  Left patellar: 2+  Right achilles: 2+  Left achilles: 2+  Right : 2+  Left : 2+  Right Shen: absent  Left Shen: absent  Right ankle clonus: absent  Left pendular knee jerk: absent

## 2024-02-21 PROBLEM — H66.92 LEFT ACUTE OTITIS MEDIA: Status: RESOLVED | Noted: 2017-01-11 | Resolved: 2024-02-21

## 2024-05-28 ENCOUNTER — TELEPHONE (OUTPATIENT)
Age: 59
End: 2024-05-28

## 2024-05-28 NOTE — TELEPHONE ENCOUNTER
Caller: sindi Ibrahim    Doctor: Bridget    Reason for call: pt returning nurses call    Call back#: [phone]

## 2024-05-28 NOTE — TELEPHONE ENCOUNTER
Caller: Alexandria BARNES    Doctor: Loida Robins    Reason for call: Pt called in to request a procedure .Please advise     Call back#: [phone]

## 2024-05-30 NOTE — TELEPHONE ENCOUNTER
Per chart review, pt has not been seen in office since 2022. Please assist with scheduling re-eval. Thank you

## 2025-01-21 ENCOUNTER — EVALUATION (OUTPATIENT)
Dept: PHYSICAL THERAPY | Facility: REHABILITATION | Age: 60
End: 2025-01-21
Payer: COMMERCIAL

## 2025-01-21 DIAGNOSIS — M76.60 ACHILLES TENDON PAIN: Primary | ICD-10-CM

## 2025-01-21 PROCEDURE — 97161 PT EVAL LOW COMPLEX 20 MIN: CPT | Performed by: PHYSICAL THERAPIST

## 2025-01-21 PROCEDURE — 97530 THERAPEUTIC ACTIVITIES: CPT | Performed by: PHYSICAL THERAPIST

## 2025-01-21 NOTE — PROGRESS NOTES
PT Evaluation     Today's date: 2025  Patient name: Alexandria Friedman  : 1965  MRN: [id number]  Referring provider: Velma Avina DPM  Dx:   Encounter Diagnosis     ICD-10-CM    1. Achilles tendon pain  M76.60                      Assessment  Impairments: abnormal muscle tone, abnormal or restricted ROM, abnormal movement, activity intolerance, impaired physical strength, lacks appropriate home exercise program, pain with function and weight-bearing intolerance  Functional limitations: Difficulty w/ prolonged ambulation, lifting/carrying/pushing/pulling objects    Assessment details: Alexandria is a 59 y.o. female presenting to PT w/ history of ankle pain. Pt would benefit from skilled PT services to address the below listed impairments and functional limitations to encourage return to PLOF.       Goals  STG: Ankle ROM improved by at least 25% in all deficient planes in 4 weeks.   STG: Subjectively reports pain at worst decreased by 2 points in 4 weeks.   STG: I w/ HEPs 1 week after prescription.   LTG: Walks w/o pain by d/c.   LTG: FOTO >= to goal by d/c.   LTG: I w/ comprehensive HEP by d/c.         Plan  Patient would benefit from: PT eval and skilled physical therapy  Planned modality interventions: TENS, thermotherapy: hydrocollator packs and cryotherapy    Planned therapy interventions: IASTM, joint mobilization, manual therapy, kinesiology taping, massage, strengthening, stretching, therapeutic activities, therapeutic exercise, therapeutic training, flexibility, functional ROM exercises, gait training, graded activity, graded exercise, graded motor, group therapy, home exercise program, balance, nerve gliding, neuromuscular re-education and patient/caregiver education    Frequency: 1x week  Duration in weeks: 8  Plan of Care beginning date: 2025  Plan of Care expiration date: 3/18/2025    Subjective Evaluation    History of Present Illness  Mechanism of injury:  doing a lot on a  ladder. Develop Achilles pain on L ankle. Sometimes getting up from sitting position can bother her. Walking more than 3 miles can get achy. Typically better w/ rest. Functionally she does not feel limited. No numbness or tingling. If she does a lot one day, sore into the next. Has a cyst on the Achilles which she has had for a while.   Patient Goals  Patient goals for therapy: decreased pain and increased motion    Pain  Current pain ratin  At best pain ratin  At worst pain ratin  Quality: throbbing, tight, dull ache, cramping, pulling and pressure        Objective     Active Range of Motion   Left Ankle/Foot   Normal active range of motion    Right Ankle/Foot   Normal active range of motion    Strength/Myotome Testing     Left Ankle/Foot   Normal strength    Right Ankle/Foot   Normal strength    Additional Strength Details  L Achilles pain produced w/ repeated SLHR           Precautions: Hx of back and neck pain      Manuals                                                                 Neuro Re-Ed                                                                                                        Ther Ex             SLHR HEP                                                                                                       Ther Activity             Education Regarding load tolerance and sx response to exercise 10'                         Gait Training                                       Modalities

## 2025-01-28 ENCOUNTER — APPOINTMENT (OUTPATIENT)
Dept: PHYSICAL THERAPY | Facility: REHABILITATION | Age: 60
End: 2025-01-28
Payer: COMMERCIAL

## 2025-02-04 ENCOUNTER — OFFICE VISIT (OUTPATIENT)
Dept: PHYSICAL THERAPY | Facility: REHABILITATION | Age: 60
End: 2025-02-04
Payer: COMMERCIAL

## 2025-02-04 DIAGNOSIS — M76.60 ACHILLES TENDON PAIN: ICD-10-CM

## 2025-02-04 DIAGNOSIS — M76.61 ACHILLES TENDINITIS OF RIGHT LOWER EXTREMITY: Primary | ICD-10-CM

## 2025-02-04 PROCEDURE — 97164 PT RE-EVAL EST PLAN CARE: CPT | Performed by: PHYSICAL THERAPIST

## 2025-02-04 PROCEDURE — 97110 THERAPEUTIC EXERCISES: CPT | Performed by: PHYSICAL THERAPIST

## 2025-02-04 NOTE — PROGRESS NOTES
PT Re-Evaluation     Today's date: 2025  Patient name: Alexandria Friedman  : 1965  MRN: [id number]  Referring provider: Velma Avina DPM  Dx:   Encounter Diagnosis     ICD-10-CM    1. Achilles tendon pain  M76.60                      Subjective: Has been doing HEP for L ankle, not bad. Returns today w/ script for R ankle. Bothers her first thing in the morning. Bothers her getting up from a chair and just walking if she walks for a while. Also bothers her going down stairs more than up. Symptoms located around Achilles tendon and heel.     Objective: See treatment diary below    R ankle ROM - WNL  SLHR - Produces and worsens symptoms on R   DLHR - Produces symptoms but tolerable on R, no symptoms on L     Assessment: HEP updated as below to address stiffness after sitting present during ambulation and stairs.     Plan: Continue per plan of care.      Precautions: Hx of back and neck pain      Manuals            Re-eval  MM                                                    Neuro Re-Ed                                                                                                         Ther Ex            SLHR HEP Reviewed           DLHR  10x - HEP           Seated soleus stretch  10x - HEP                                                                            Ther Activity             Education Regarding load tolerance and sx response to exercise 10'                         Gait Training                                       Modalities

## 2025-02-11 ENCOUNTER — OFFICE VISIT (OUTPATIENT)
Dept: PHYSICAL THERAPY | Facility: REHABILITATION | Age: 60
End: 2025-02-11
Payer: COMMERCIAL

## 2025-02-11 DIAGNOSIS — M76.61 ACHILLES TENDINITIS OF RIGHT LOWER EXTREMITY: ICD-10-CM

## 2025-02-11 DIAGNOSIS — M76.60 ACHILLES TENDON PAIN: Primary | ICD-10-CM

## 2025-02-11 PROCEDURE — 97530 THERAPEUTIC ACTIVITIES: CPT | Performed by: PHYSICAL THERAPIST

## 2025-02-11 PROCEDURE — 97110 THERAPEUTIC EXERCISES: CPT | Performed by: PHYSICAL THERAPIST

## 2025-02-11 NOTE — PROGRESS NOTES
Daily Note    Today's date: 2025  Patient name: Alexandria Friedman  : 1965  MRN: [id number]  Referring provider: Velma Avina DPM  Dx:   Encounter Diagnosis     ICD-10-CM    1. Achilles tendon pain  M76.60       2. Achilles tendinitis of right lower extremity  M76.61                      Subjective: Ankles feeling better since last session, she has also been taking prednisone for an ear problem.    Objective: See treatment diary below    Assessment: W/o pain w/ HR progressions this session. Fatigued w/ listed interventions. HEP updated as listed below. Would benefit from continued.     Plan: Continue per plan of care.      Precautions: Hx of back and neck pain      Manuals           Re-eval  MM                                                    Neuro Re-Ed                                                                                                        Ther Ex           SLHR HEP Reviewed 3x5-10 R, 10x L          DLHR  10x - HEP 10x          Seated soleus stretch  10x - HEP                                                                            Ther Activity             Education Regarding load tolerance and sx response to exercise 10'  Regarding load tolerance and HEP alteration 10'                       Gait Training                                       Modalities

## 2025-02-18 ENCOUNTER — APPOINTMENT (OUTPATIENT)
Dept: PHYSICAL THERAPY | Facility: REHABILITATION | Age: 60
End: 2025-02-18
Payer: COMMERCIAL

## 2025-02-25 ENCOUNTER — APPOINTMENT (OUTPATIENT)
Dept: PHYSICAL THERAPY | Facility: REHABILITATION | Age: 60
End: 2025-02-25
Payer: COMMERCIAL

## 2025-03-10 ENCOUNTER — OFFICE VISIT (OUTPATIENT)
Dept: OBGYN CLINIC | Facility: CLINIC | Age: 60
End: 2025-03-10
Payer: COMMERCIAL

## 2025-03-10 VITALS — WEIGHT: 174 LBS | HEIGHT: 63 IN | BODY MASS INDEX: 30.83 KG/M2

## 2025-03-10 DIAGNOSIS — M72.0 DUPUYTREN'S CONTRACTURE OF LEFT HAND: Primary | ICD-10-CM

## 2025-03-10 PROCEDURE — 99203 OFFICE O/P NEW LOW 30 MIN: CPT | Performed by: ORTHOPAEDIC SURGERY

## 2025-03-10 NOTE — PROGRESS NOTES
ORTHOPAEDIC HAND, WRIST, AND ELBOW OFFICE  VISIT     Name: Alexandria Friedman      : 1965      MRN: [id number]  Encounter Provider: Jarrod Navarro MD  Encounter Date: 3/10/2025   Encounter department: [facility]  :  Assessment & Plan  Dupuytren's contracture of left hand  - Explained to the patient that the nodules present are consistent with Dupuytren's disease.  -Discussed treatment options including continued observation, cortisone injection, versus surgical intervention.  -Patient was offered and declined cortisone injection at time of visit  - Explained that due to her having the bumps and not the pit with the cord, Xiaflex is not indicated at this time  - Recommendation for watchful waiting to evaluate for decrease in ROM  - She is advised to continue with stretching and she is able to ride her bike without causing more damage   -Patient will follow-up in the office as needed if symptoms persist, or range of motion decreases         General Discussions:  Dupuytren's Disease:  The anatomy and physiology of Dupuytren's disease were discussed with the patient today in the office.  Increased scar formation within the interval between the skin volarly and the flexor tendons dorsally can result in pit formation, nodular formation, or eventual cord formation.  These pathologic cords can cause contracture at either the metacarpophalangeal joint, proximal interphalangeal joint, or both.  As the cords progress towards the proximal interphalangeal joint, the neurovascular structures of the finger may become involved within the disease process.  While this is a genetic condition, variable penetrance occurs within family trees.  Conservative treatment options including therapy to maintain joint mobility and a tabletop test were discussed.  Other treatments include Xiaflex and possible surgical intervention.     Operative Discussions:       History of Present Illness  "  HPI  Chief Complaint   Patient presents with   • Right Hand - New Patient Visit       Alexandria Friedman is a 59 y.o. female who presents for evaluation of 2 bumps in her left palm.  Patient states that the bumps have been present for approximately 1 month.  She states that she does not remember any traumatic injury that caused them to occur.  She states that they do not bother her and they are not painful however she just wanted to make sure that she is not going to make anything worse by using her hands.      REVIEW OF SYSTEMS:  General: no fever, no chills  HEENT:  No loss of hearing or eyesight problems  Eyes:  No red eyes  Respiratory:  No coughing, shortness of breath or wheezing  Cardiovascular:  No chest pain, no palpitations  GI:  Abdomen soft nontender, denies nausea  Endocrine:  No muscle weakness, no frequent urination, no excessive thirst  Urinary:  No dysuria, no incontinence  Musculoskeletal: see HPI and PE  SKIN:  No skin rash, no dry skin  Neurological:  No headaches, no confusion  Psychiatric:  No suicide thoughts, no anxiety, no depression  Review of all other systems is negative    Objective   Ht 5' 3\" (1.6 m)   Wt 78.9 kg (174 lb)   LMP  (LMP Unknown)   BMI 30.82 kg/m²      General: well developed and well nourished, alert, oriented times 3, and appears comfortable  Psychiatric: Normal  HEENT: Trachea Midline, No torticollis  Cardiovascular: No discernable arrhythmia  Pulmonary: No wheezing or stridor  Abdomen: No rebound or guarding  Extremities: No peripheral edema  Skin: No masses, erythema, lacerations, fluctation, ulcerations  Neurovascular: Sensation Intact to the Median, Ulnar, Radial Nerve, Motor Intact to the Median, Ulnar, Radial Nerve, and Pulses Intact    Musculoskeletal exam  right Hand -    Patient presents with obvious anatomical deformity of Dupuytren's nodules present into left palmar aspect  Skin is warm and dry to touch with no signs of erythema, ecchymosis, or " infection  No soft tissue swelling or effusion noted  Full FDS, FDP, extensor mechanisms are intact  No rotational deformity with composite finger flexion  TTP with palpable nodule in the small finger flexor tendon local to A1 Pulley  No Reproducible triggering on exam  Dupuytren's nodule present at palm of left hand in line with left ring finger  Demonstrates normal wrist, elbow, and shoulder motion  Forearm compartments are soft and supple  2+ distal radial pulse with brisk capillary refill to the fingers  Radial, median, and ulnar motor and sensory distribution intact  Sensations light to touch intact distally      STUDIES REVIEWED:  No Studies to review      PROCEDURES PERFORMED:  Procedures  No Procedures performed today      Scribe Attestation    I,:  Megan Lainez am acting as a scribe while in the presence of the attending physician.:       I,:  Jarrod Navarro MD personally performed the services described in this documentation    as scribed in my presence.: